# Patient Record
Sex: MALE | Race: WHITE | Employment: OTHER | URBAN - METROPOLITAN AREA
[De-identification: names, ages, dates, MRNs, and addresses within clinical notes are randomized per-mention and may not be internally consistent; named-entity substitution may affect disease eponyms.]

---

## 2021-12-09 ENCOUNTER — APPOINTMENT (OUTPATIENT)
Dept: GENERAL RADIOLOGY | Age: 66
DRG: 247 | End: 2021-12-09
Attending: EMERGENCY MEDICINE
Payer: MEDICARE

## 2021-12-09 ENCOUNTER — HOSPITAL ENCOUNTER (INPATIENT)
Age: 66
LOS: 1 days | Discharge: HOME | DRG: 247 | End: 2021-12-10
Attending: EMERGENCY MEDICINE | Admitting: FAMILY MEDICINE
Payer: MEDICARE

## 2021-12-09 DIAGNOSIS — I20.0 UNSTABLE ANGINA (HCC): ICD-10-CM

## 2021-12-09 DIAGNOSIS — I21.4 NSTEMI (NON-ST ELEVATED MYOCARDIAL INFARCTION) (HCC): Primary | ICD-10-CM

## 2021-12-09 DIAGNOSIS — I21.4 NSTEMI, INITIAL EPISODE OF CARE (HCC): ICD-10-CM

## 2021-12-09 PROBLEM — R07.9 CHEST PAIN: Status: ACTIVE | Noted: 2021-12-09

## 2021-12-09 LAB
ALBUMIN SERPL-MCNC: 4.4 G/DL (ref 3.5–5)
ALBUMIN/GLOB SERPL: 1.2 {RATIO} (ref 1.1–2.2)
ALP SERPL-CCNC: 83 U/L (ref 45–117)
ALT SERPL-CCNC: 34 U/L (ref 12–78)
ANION GAP SERPL CALC-SCNC: 8 MMOL/L (ref 5–15)
APTT PPP: 21.7 SEC (ref 22.1–31)
APTT PPP: 43 SEC (ref 22.1–31)
AST SERPL-CCNC: 30 U/L (ref 15–37)
BASOPHILS # BLD: 0.1 K/UL (ref 0–0.1)
BASOPHILS NFR BLD: 1 % (ref 0–1)
BILIRUB SERPL-MCNC: 0.5 MG/DL (ref 0.2–1)
BNP SERPL-MCNC: 323 PG/ML (ref 0–125)
BUN SERPL-MCNC: 26 MG/DL (ref 6–20)
BUN/CREAT SERPL: 23 (ref 12–20)
CALCIUM SERPL-MCNC: 9 MG/DL (ref 8.5–10.1)
CHLORIDE SERPL-SCNC: 104 MMOL/L (ref 97–108)
CO2 SERPL-SCNC: 29 MMOL/L (ref 21–32)
COMMENT, HOLDF: NORMAL
COVID-19 RAPID TEST, COVR: NOT DETECTED
CREAT SERPL-MCNC: 1.14 MG/DL (ref 0.7–1.3)
DIFFERENTIAL METHOD BLD: NORMAL
EOSINOPHIL # BLD: 0.1 K/UL (ref 0–0.4)
EOSINOPHIL NFR BLD: 3 % (ref 0–7)
ERYTHROCYTE [DISTWIDTH] IN BLOOD BY AUTOMATED COUNT: 12.3 % (ref 11.5–14.5)
GLOBULIN SER CALC-MCNC: 3.6 G/DL (ref 2–4)
GLUCOSE SERPL-MCNC: 87 MG/DL (ref 65–100)
HCT VFR BLD AUTO: 47.3 % (ref 36.6–50.3)
HGB BLD-MCNC: 15.5 G/DL (ref 12.1–17)
IMM GRANULOCYTES # BLD AUTO: 0 K/UL (ref 0–0.04)
IMM GRANULOCYTES NFR BLD AUTO: 0 % (ref 0–0.5)
LYMPHOCYTES # BLD: 0.8 K/UL (ref 0.8–3.5)
LYMPHOCYTES NFR BLD: 18 % (ref 12–49)
MAGNESIUM SERPL-MCNC: 2.2 MG/DL (ref 1.6–2.4)
MCH RBC QN AUTO: 30.5 PG (ref 26–34)
MCHC RBC AUTO-ENTMCNC: 32.8 G/DL (ref 30–36.5)
MCV RBC AUTO: 93.1 FL (ref 80–99)
MONOCYTES # BLD: 0.5 K/UL (ref 0–1)
MONOCYTES NFR BLD: 10 % (ref 5–13)
NEUTS SEG # BLD: 3.3 K/UL (ref 1.8–8)
NEUTS SEG NFR BLD: 68 % (ref 32–75)
NRBC # BLD: 0 K/UL (ref 0–0.01)
NRBC BLD-RTO: 0 PER 100 WBC
PLATELET # BLD AUTO: 173 K/UL (ref 150–400)
PMV BLD AUTO: 10.4 FL (ref 8.9–12.9)
POTASSIUM SERPL-SCNC: 4.9 MMOL/L (ref 3.5–5.1)
PROT SERPL-MCNC: 8 G/DL (ref 6.4–8.2)
RBC # BLD AUTO: 5.08 M/UL (ref 4.1–5.7)
SAMPLES BEING HELD,HOLD: NORMAL
SODIUM SERPL-SCNC: 141 MMOL/L (ref 136–145)
SOURCE, COVRS: NORMAL
THERAPEUTIC RANGE,PTTT: ABNORMAL SECS (ref 58–77)
THERAPEUTIC RANGE,PTTT: ABNORMAL SECS (ref 58–77)
TROPONIN-HIGH SENSITIVITY: 107 NG/L (ref 0–76)
TROPONIN-HIGH SENSITIVITY: 162 NG/L (ref 0–76)
TROPONIN-HIGH SENSITIVITY: 189 NG/L (ref 0–76)
TROPONIN-HIGH SENSITIVITY: 80 NG/L (ref 0–76)
TSH SERPL DL<=0.05 MIU/L-ACNC: 2.91 UIU/ML (ref 0.36–3.74)
WBC # BLD AUTO: 4.8 K/UL (ref 4.1–11.1)

## 2021-12-09 PROCEDURE — 65660000000 HC RM CCU STEPDOWN

## 2021-12-09 PROCEDURE — 84443 ASSAY THYROID STIM HORMONE: CPT

## 2021-12-09 PROCEDURE — 74011250637 HC RX REV CODE- 250/637: Performed by: EMERGENCY MEDICINE

## 2021-12-09 PROCEDURE — 74011250636 HC RX REV CODE- 250/636: Performed by: EMERGENCY MEDICINE

## 2021-12-09 PROCEDURE — 96376 TX/PRO/DX INJ SAME DRUG ADON: CPT

## 2021-12-09 PROCEDURE — 99223 1ST HOSP IP/OBS HIGH 75: CPT | Performed by: SPECIALIST

## 2021-12-09 PROCEDURE — 36415 COLL VENOUS BLD VENIPUNCTURE: CPT

## 2021-12-09 PROCEDURE — 94761 N-INVAS EAR/PLS OXIMETRY MLT: CPT

## 2021-12-09 PROCEDURE — 87635 SARS-COV-2 COVID-19 AMP PRB: CPT

## 2021-12-09 PROCEDURE — 85025 COMPLETE CBC W/AUTO DIFF WBC: CPT

## 2021-12-09 PROCEDURE — 84484 ASSAY OF TROPONIN QUANT: CPT

## 2021-12-09 PROCEDURE — G0378 HOSPITAL OBSERVATION PER HR: HCPCS

## 2021-12-09 PROCEDURE — 96374 THER/PROPH/DIAG INJ IV PUSH: CPT

## 2021-12-09 PROCEDURE — 83880 ASSAY OF NATRIURETIC PEPTIDE: CPT

## 2021-12-09 PROCEDURE — 93005 ELECTROCARDIOGRAM TRACING: CPT

## 2021-12-09 PROCEDURE — 85730 THROMBOPLASTIN TIME PARTIAL: CPT

## 2021-12-09 PROCEDURE — 74011250637 HC RX REV CODE- 250/637: Performed by: FAMILY MEDICINE

## 2021-12-09 PROCEDURE — 74011250636 HC RX REV CODE- 250/636: Performed by: FAMILY MEDICINE

## 2021-12-09 PROCEDURE — 80053 COMPREHEN METABOLIC PANEL: CPT

## 2021-12-09 PROCEDURE — 71045 X-RAY EXAM CHEST 1 VIEW: CPT

## 2021-12-09 PROCEDURE — 83735 ASSAY OF MAGNESIUM: CPT

## 2021-12-09 PROCEDURE — 99285 EMERGENCY DEPT VISIT HI MDM: CPT

## 2021-12-09 RX ORDER — SODIUM CHLORIDE 0.9 % (FLUSH) 0.9 %
5-40 SYRINGE (ML) INJECTION AS NEEDED
Status: DISCONTINUED | OUTPATIENT
Start: 2021-12-09 | End: 2021-12-12 | Stop reason: HOSPADM

## 2021-12-09 RX ORDER — AMLODIPINE BESYLATE 5 MG/1
5 TABLET ORAL DAILY
Status: DISCONTINUED | OUTPATIENT
Start: 2021-12-09 | End: 2021-12-12 | Stop reason: HOSPADM

## 2021-12-09 RX ORDER — ACETAMINOPHEN 325 MG/1
650 TABLET ORAL
Status: DISCONTINUED | OUTPATIENT
Start: 2021-12-09 | End: 2021-12-12 | Stop reason: HOSPADM

## 2021-12-09 RX ORDER — HEPARIN SODIUM 1000 [USP'U]/ML
2000 INJECTION, SOLUTION INTRAVENOUS; SUBCUTANEOUS ONCE
Status: COMPLETED | OUTPATIENT
Start: 2021-12-09 | End: 2021-12-09

## 2021-12-09 RX ORDER — HEPARIN SODIUM 1000 [USP'U]/ML
4000 INJECTION, SOLUTION INTRAVENOUS; SUBCUTANEOUS ONCE
Status: COMPLETED | OUTPATIENT
Start: 2021-12-09 | End: 2021-12-09

## 2021-12-09 RX ORDER — GUAIFENESIN 100 MG/5ML
81 LIQUID (ML) ORAL DAILY
Status: DISCONTINUED | OUTPATIENT
Start: 2021-12-10 | End: 2021-12-12 | Stop reason: HOSPADM

## 2021-12-09 RX ORDER — SODIUM CHLORIDE 0.9 % (FLUSH) 0.9 %
5-40 SYRINGE (ML) INJECTION EVERY 8 HOURS
Status: DISCONTINUED | OUTPATIENT
Start: 2021-12-09 | End: 2021-12-12 | Stop reason: HOSPADM

## 2021-12-09 RX ORDER — GUAIFENESIN 100 MG/5ML
162 LIQUID (ML) ORAL
Status: COMPLETED | OUTPATIENT
Start: 2021-12-09 | End: 2021-12-09

## 2021-12-09 RX ORDER — NITROGLYCERIN 0.4 MG/1
0.4 TABLET SUBLINGUAL
Status: DISCONTINUED | OUTPATIENT
Start: 2021-12-09 | End: 2021-12-12 | Stop reason: HOSPADM

## 2021-12-09 RX ORDER — HEPARIN SODIUM 10000 [USP'U]/100ML
11-25 INJECTION, SOLUTION INTRAVENOUS
Status: DISCONTINUED | OUTPATIENT
Start: 2021-12-09 | End: 2021-12-10

## 2021-12-09 RX ORDER — SODIUM CHLORIDE 9 MG/ML
75 INJECTION, SOLUTION INTRAVENOUS CONTINUOUS
Status: DISCONTINUED | OUTPATIENT
Start: 2021-12-09 | End: 2021-12-12 | Stop reason: HOSPADM

## 2021-12-09 RX ADMIN — AMLODIPINE BESYLATE 5 MG: 5 TABLET ORAL at 16:43

## 2021-12-09 RX ADMIN — HEPARIN SODIUM AND DEXTROSE 11 UNITS/KG/HR: 10000; 5 INJECTION INTRAVENOUS at 12:42

## 2021-12-09 RX ADMIN — HEPARIN SODIUM AND DEXTROSE 13 UNITS/KG/HR: 10000; 5 INJECTION INTRAVENOUS at 19:36

## 2021-12-09 RX ADMIN — Medication 10 ML: at 16:43

## 2021-12-09 RX ADMIN — ASPIRIN 81 MG CHEWABLE TABLET 162 MG: 81 TABLET CHEWABLE at 08:54

## 2021-12-09 RX ADMIN — HEPARIN SODIUM 2000 UNITS: 1000 INJECTION INTRAVENOUS; SUBCUTANEOUS at 19:36

## 2021-12-09 RX ADMIN — HEPARIN SODIUM 4000 UNITS: 1000 INJECTION INTRAVENOUS; SUBCUTANEOUS at 12:40

## 2021-12-09 RX ADMIN — SODIUM CHLORIDE 75 ML/HR: 9 INJECTION, SOLUTION INTRAVENOUS at 15:59

## 2021-12-09 NOTE — CONSULTS
12/9/2021    Cardiology Initial CareNote   [facility] Siv[place]umar [place] , [place]   --------PCP:-Jaquan Cox MD   -----Subjective:   Sandi Salazar is a 74 y.o. male  Initial Care Note requested for evaluation and management of   Elevated troponin and chest discomfort. Patient reports no prior medical history though he does admit 5 years ago he was told by his PCP to start considering medicines for lipids and blood pressure. He says he exercises regularly almost every day going to the gym. The last 4 days when he is exercise he is felt a chest discomfort which he says is not a chest pain. He says the chest bad feeling in the middle of his chest but is only present during the exercise initially. This discomfort is not associated with shortness of breath. He said no edema. proBNP is 323  Prior cardiac history denies  Used to chew tobacco retired [employer] [address] [address] wife present at bedside    Discussion/Plan/Impression:    His symptoms of chest discomfort are related to exertion and in that way sound typical for a pattern of unstable angina. What is less typical is that his troponins are not that elevated for 4 days of discomfort. I have discussed with him options for cath versus stress test and given the management is managed with both my recommendation is cardiac cath and he agrees to proceed he will have a radial left heart cath with Dr. Ofelia Payton tomorrow. He may eat tonight. He has been placed on heparin. He has had some EKG changes in the anterior wall with ST and T is  biphasic.        Lab Results   Component Value Date/Time    Creatinine 1.14 12/09/2021 08:26 AM      Results for orders placed or performed during the hospital encounter of 12/09/21   EKG, 12 LEAD, INITIAL   Result Value Ref Range    Ventricular Rate 65 BPM    Atrial Rate 65 BPM    P-R Interval 154 ms    QRS Duration 94 ms    Q-T Interval 404 ms    QTC Calculation (Bezet) 420 ms    Calculated P Axis 56 degrees    Calculated R Axis 12 degrees    Calculated T Axis 24 degrees    Diagnosis       Normal sinus rhythm  T wave abnormality, consider anterolateral ischemia  Abnormal ECG  When compared with ECG of 09-DEC-2021 08:51,  MANUAL COMPARISON REQUIRED, DATA IS UNCONFIRMED         Cardiac Studies/Hx:  No specialty comments available. Medical history notable for  has no past medical history on file. Social history notable for    Family history notable for family history is not on file. No past medical history on file. ROS-pertinents  negative except as above  The pertinent portions of the medical history,physician and nursing notes, meds,vitals , labs and Ins/Outs,are reviewed in the electronic record  Pt reports   Chest discomfortand the remainder of a complete multisystem 11 ROS  Are reviewed and negative    Social History     Tobacco Use    Smoking status: Not on file    Smokeless tobacco: Not on file   Substance Use Topics    Alcohol use: Not on file    Drug use: Not on file      No family history on file.    None     No Known Allergies   Objective:    Physical Exam:   Patient Vitals for the past 12 hrs:   Temp Pulse Resp BP SpO2   12/09/21 1545 97.9 °F (36.6 °C) (!) 57 17 (!) 143/84 94 %   12/09/21 1438  (!) 57      12/09/21 1435 97.9 °F (36.6 °C) 61 18 (!) 140/90 95 %   12/09/21 1100  64 16 118/73 96 %   12/09/21 1030  75 17 131/72 97 %   12/09/21 0900  66  139/81 97 %   12/09/21 0823 97.4 °F (36.3 °C) 72 14 (!) 171/81 99 %      General:  alert, cooperative, no distress, appears stated age   HEENT, Neck:  NC,AT- no JVD   Chest Wall: inspection normal - no chest wall deformities or tenderness, respiratory effort normal   Lung:   clear to auscultation bilaterally   Heart:    normal rate, regular rhythm, normal S1, S2, no murmurs, rubs, clicks or gallops   Abdomen: nondistended   Extremities: extremities normal, atraumatic, no cyanosis or edema     Last 24hr Input/Output:  No intake or output data in the 24 hours ending 12/09/21 3641     Data Review:   Recent Results (from the past 24 hour(s))   SAMPLES BEING HELD    Collection Time: 12/09/21  8:26 AM   Result Value Ref Range    SAMPLES BEING HELD 1RED, 1BLUE     COMMENT        Add-on orders for these samples will be processed based on acceptable specimen integrity and analyte stability, which may vary by analyte. NT-PRO BNP    Collection Time: 12/09/21  8:26 AM   Result Value Ref Range    NT pro- (H) 0 - 125 PG/ML   MAGNESIUM    Collection Time: 12/09/21  8:26 AM   Result Value Ref Range    Magnesium 2.2 1.6 - 2.4 mg/dL   CBC WITH AUTOMATED DIFF    Collection Time: 12/09/21  8:26 AM   Result Value Ref Range    WBC 4.8 4.1 - 11.1 K/uL    RBC 5.08 4.10 - 5.70 M/uL    HGB 15.5 12.1 - 17.0 g/dL    HCT 47.3 36.6 - 50.3 %    MCV 93.1 80.0 - 99.0 FL    MCH 30.5 26.0 - 34.0 PG    MCHC 32.8 30.0 - 36.5 g/dL    RDW 12.3 11.5 - 14.5 %    PLATELET 743 501 - 691 K/uL    MPV 10.4 8.9 - 12.9 FL    NRBC 0.0 0  WBC    ABSOLUTE NRBC 0.00 0.00 - 0.01 K/uL    NEUTROPHILS 68 32 - 75 %    LYMPHOCYTES 18 12 - 49 %    MONOCYTES 10 5 - 13 %    EOSINOPHILS 3 0 - 7 %    BASOPHILS 1 0 - 1 %    IMMATURE GRANULOCYTES 0 0.0 - 0.5 %    ABS. NEUTROPHILS 3.3 1.8 - 8.0 K/UL    ABS. LYMPHOCYTES 0.8 0.8 - 3.5 K/UL    ABS. MONOCYTES 0.5 0.0 - 1.0 K/UL    ABS. EOSINOPHILS 0.1 0.0 - 0.4 K/UL    ABS. BASOPHILS 0.1 0.0 - 0.1 K/UL    ABS. IMM.  GRANS. 0.0 0.00 - 0.04 K/UL    DF AUTOMATED     METABOLIC PANEL, COMPREHENSIVE    Collection Time: 12/09/21  8:26 AM   Result Value Ref Range    Sodium 141 136 - 145 mmol/L    Potassium 4.9 3.5 - 5.1 mmol/L    Chloride 104 97 - 108 mmol/L    CO2 29 21 - 32 mmol/L    Anion gap 8 5 - 15 mmol/L    Glucose 87 65 - 100 mg/dL    BUN 26 (H) 6 - 20 MG/DL    Creatinine 1.14 0.70 - 1.30 MG/DL    BUN/Creatinine ratio 23 (H) 12 - 20      GFR est AA >60 >60 ml/min/1.73m2    GFR est non-AA >60 >60 ml/min/1.73m2    Calcium 9.0 8.5 - 10.1 MG/DL Bilirubin, total 0.5 0.2 - 1.0 MG/DL    ALT (SGPT) 34 12 - 78 U/L    AST (SGOT) 30 15 - 37 U/L    Alk.  phosphatase 83 45 - 117 U/L    Protein, total 8.0 6.4 - 8.2 g/dL    Albumin 4.4 3.5 - 5.0 g/dL    Globulin 3.6 2.0 - 4.0 g/dL    A-G Ratio 1.2 1.1 - 2.2     TROPONIN-HIGH SENSITIVITY    Collection Time: 12/09/21  8:26 AM   Result Value Ref Range    Troponin-High Sensitivity 80 (HH) 0 - 76 ng/L   PTT    Collection Time: 12/09/21  8:26 AM   Result Value Ref Range    aPTT 21.7 (L) 22.1 - 31.0 sec    aPTT, therapeutic range     58.0 - 77.0 SECS   EKG, 12 LEAD, INITIAL    Collection Time: 12/09/21  8:26 AM   Result Value Ref Range    Ventricular Rate 63 BPM    Atrial Rate 63 BPM    P-R Interval 148 ms    QRS Duration 100 ms    Q-T Interval 392 ms    QTC Calculation (Bezet) 401 ms    Calculated P Axis 72 degrees    Calculated R Axis 26 degrees    Calculated T Axis 52 degrees    Diagnosis       Normal sinus rhythm  T wave abnormality, consider anterior ischemia  Abnormal ECG  No previous ECGs available     EKG, 12 LEAD, INITIAL    Collection Time: 12/09/21  8:51 AM   Result Value Ref Range    Ventricular Rate 68 BPM    Atrial Rate 68 BPM    P-R Interval 152 ms    QRS Duration 98 ms    Q-T Interval 412 ms    QTC Calculation (Bezet) 438 ms    Calculated P Axis 58 degrees    Calculated R Axis 11 degrees    Calculated T Axis 13 degrees    Diagnosis       Normal sinus rhythm  T wave abnormality, consider anterolateral ischemia  Abnormal ECG  When compared with ECG of 09-DEC-2021 08:26,  MANUAL COMPARISON REQUIRED, DATA IS UNCONFIRMED     EKG, 12 LEAD, INITIAL    Collection Time: 12/09/21  9:09 AM   Result Value Ref Range    Ventricular Rate 65 BPM    Atrial Rate 65 BPM    P-R Interval 154 ms    QRS Duration 94 ms    Q-T Interval 404 ms    QTC Calculation (Bezet) 420 ms    Calculated P Axis 56 degrees    Calculated R Axis 12 degrees    Calculated T Axis 24 degrees    Diagnosis       Normal sinus rhythm  T wave abnormality, consider anterolateral ischemia  Abnormal ECG  When compared with ECG of 09-DEC-2021 08:51,  MANUAL COMPARISON REQUIRED, DATA IS UNCONFIRMED     COVID-19 RAPID TEST    Collection Time: 12/09/21  9:30 AM   Result Value Ref Range    Specimen source Nasopharyngeal      COVID-19 rapid test Not detected NOTD     TROPONIN-HIGH SENSITIVITY    Collection Time: 12/09/21 11:27 AM   Result Value Ref Range    Troponin-High Sensitivity 107 (HH) 0 - 76 ng/L      No results found for: CHOL, CHOLX, CHLST, CHOLV, 344671, HDL, HDLP, LDL, LDLC, DLDLP, Opal García [facility], Remigio Lowe MD 12/9/2021

## 2021-12-09 NOTE — ED TRIAGE NOTES
Pt reports intermittent CP and tightness x4 days. Pt denies SOB. Pt reports he takes an 81 mg ASA daily. Pt denies any previous cardiac history.

## 2021-12-09 NOTE — PROGRESS NOTES
Spoke to ER doctor and reviewed case  CP x 4 days, worse today, now CP free with EKG changes ( not yet available for review) and first trop is 80 (borderline)  Agree with admit, will go to Hospitalist/Internal Medicine team service and I will see once arrives to Salomon Cedillo MD

## 2021-12-09 NOTE — H&P
History & Physical    Primary Care Provider: Chelsi Richard MD  Source of Information: Patient     History of Presenting Illness:   Cindy Olivier is a 74 y.o. male who presents with chest pain    History was probably obtained from the patient    Patient reports that his pain started about 3 days back, reports that he would not describe it truly is pain with pressure like sensation in a band shaped fashion around his chest.  Patient reports that about 3 days back he was on the treadmill under him and he had some discomfort in his chest, patient reports this morning he woke up and continued to feel the discomfort in his chest, got concerned and decided to come to the hospital, patient reports that about 5 years ago he went to his primary care doctor was told he has hypertension hyperlipidemia but chose to modify his diet diet and make lifestyle changes to mitigate the disease. Patient denies any other complaints or problems. The patient denies any Headache, blurry vision, sore throat, trouble swallowing, trouble with speech,, SOB, cough, fever, chills, N/V/D, abd pain, urinary symptoms, constipation, recent travels, sick contacts, focal or generalized neurological symptoms,  falls, injuries, rashes, contact with COVID-19 diagnosed patients, hematemesis, melena, hemoptysis, hematuria, rashes, denies starting any new medications and denies any other concerns or problems besides as mentioned above. Review of Systems:  A comprehensive review of systems was negative except for that written in the History of Present Illness. All other systems reviewed, pertinent positives and negatives noted in HPI    No past medical history on file. No past surgical history on file. Prior to Admission medications    Not on File     No Known Allergies   No family history on file.    Family history was discussed with the patient, all pertinent and relevant details are mentioned as above, no other pertinent and relevant family history was noted on my discussion with the patient. Patient specifically denies any history of Gaucher disease in the family  SOCIAL HISTORY:  Patient resides:  Independently X   Assisted Living    SNF    With family care       Smoking history:   None X   Former    Chronic      Alcohol history:   None    Social X   Chronic      Ambulates:   Independently X   w/cane    w/walker    w/wc    CODE STATUS:  DNR    Full X   Other      Objective:     Physical Exam:     Visit Vitals  BP (!) 140/90 (BP 1 Location: Right upper arm, BP Patient Position: At rest)   Pulse (!) 57   Temp 97.9 °F (36.6 °C)   Resp 18   Ht 5' 9\" (1.753 m)   Wt 88.2 kg (194 lb 7.1 oz)   SpO2 95%   BMI 28.71 kg/m²      O2 Device: None (Room air)    General : alert x 3, awake, no acute distress, resting in bed, pleasant male, appears to be stated age  HEENT: PEERL, EOMI, moist mucus membrane, TM clear  Neck: supple, no JVD, no meningeal signs  Chest: Clear to auscultation bilaterally   CVS: S1 S2 heard, Capillary refill less than 2 seconds  Abd: soft/ Non tender, non distended, BS physiological,   Ext: no clubbing, no cyanosis, no edema, brisk 2+ DP pulses  Neuro/Psych: pleasant mood and affect, CN 2-12 grossly intact, sensory grossly within normal limit, Strength 5/5 in all extremities, DTR 1+ x 4  Skin: warm     EKG:  . Data Review:     Recent Days:  Recent Labs     12/09/21  0826   WBC 4.8   HGB 15.5   HCT 47.3        Recent Labs     12/09/21  0826      K 4.9      CO2 29   GLU 87   BUN 26*   CREA 1.14   CA 9.0   MG 2.2   ALB 4.4   ALT 34     No results for input(s): PH, PCO2, PO2, HCO3, FIO2 in the last 72 hours.     24 Hour Results:  Recent Results (from the past 24 hour(s))   SAMPLES BEING HELD    Collection Time: 12/09/21  8:26 AM   Result Value Ref Range    SAMPLES BEING HELD 1RED, 1BLUE     COMMENT        Add-on orders for these samples will be processed based on acceptable specimen integrity and analyte stability, which may vary by analyte. NT-PRO BNP    Collection Time: 12/09/21  8:26 AM   Result Value Ref Range    NT pro- (H) 0 - 125 PG/ML   MAGNESIUM    Collection Time: 12/09/21  8:26 AM   Result Value Ref Range    Magnesium 2.2 1.6 - 2.4 mg/dL   CBC WITH AUTOMATED DIFF    Collection Time: 12/09/21  8:26 AM   Result Value Ref Range    WBC 4.8 4.1 - 11.1 K/uL    RBC 5.08 4.10 - 5.70 M/uL    HGB 15.5 12.1 - 17.0 g/dL    HCT 47.3 36.6 - 50.3 %    MCV 93.1 80.0 - 99.0 FL    MCH 30.5 26.0 - 34.0 PG    MCHC 32.8 30.0 - 36.5 g/dL    RDW 12.3 11.5 - 14.5 %    PLATELET 860 305 - 963 K/uL    MPV 10.4 8.9 - 12.9 FL    NRBC 0.0 0  WBC    ABSOLUTE NRBC 0.00 0.00 - 0.01 K/uL    NEUTROPHILS 68 32 - 75 %    LYMPHOCYTES 18 12 - 49 %    MONOCYTES 10 5 - 13 %    EOSINOPHILS 3 0 - 7 %    BASOPHILS 1 0 - 1 %    IMMATURE GRANULOCYTES 0 0.0 - 0.5 %    ABS. NEUTROPHILS 3.3 1.8 - 8.0 K/UL    ABS. LYMPHOCYTES 0.8 0.8 - 3.5 K/UL    ABS. MONOCYTES 0.5 0.0 - 1.0 K/UL    ABS. EOSINOPHILS 0.1 0.0 - 0.4 K/UL    ABS. BASOPHILS 0.1 0.0 - 0.1 K/UL    ABS. IMM. GRANS. 0.0 0.00 - 0.04 K/UL    DF AUTOMATED     METABOLIC PANEL, COMPREHENSIVE    Collection Time: 12/09/21  8:26 AM   Result Value Ref Range    Sodium 141 136 - 145 mmol/L    Potassium 4.9 3.5 - 5.1 mmol/L    Chloride 104 97 - 108 mmol/L    CO2 29 21 - 32 mmol/L    Anion gap 8 5 - 15 mmol/L    Glucose 87 65 - 100 mg/dL    BUN 26 (H) 6 - 20 MG/DL    Creatinine 1.14 0.70 - 1.30 MG/DL    BUN/Creatinine ratio 23 (H) 12 - 20      GFR est AA >60 >60 ml/min/1.73m2    GFR est non-AA >60 >60 ml/min/1.73m2    Calcium 9.0 8.5 - 10.1 MG/DL    Bilirubin, total 0.5 0.2 - 1.0 MG/DL    ALT (SGPT) 34 12 - 78 U/L    AST (SGOT) 30 15 - 37 U/L    Alk.  phosphatase 83 45 - 117 U/L    Protein, total 8.0 6.4 - 8.2 g/dL    Albumin 4.4 3.5 - 5.0 g/dL    Globulin 3.6 2.0 - 4.0 g/dL    A-G Ratio 1.2 1.1 - 2.2     TROPONIN-HIGH SENSITIVITY    Collection Time: 12/09/21 8:26 AM   Result Value Ref Range    Troponin-High Sensitivity 80 (HH) 0 - 76 ng/L   PTT    Collection Time: 12/09/21  8:26 AM   Result Value Ref Range    aPTT 21.7 (L) 22.1 - 31.0 sec    aPTT, therapeutic range     58.0 - 77.0 SECS   EKG, 12 LEAD, INITIAL    Collection Time: 12/09/21  8:26 AM   Result Value Ref Range    Ventricular Rate 63 BPM    Atrial Rate 63 BPM    P-R Interval 148 ms    QRS Duration 100 ms    Q-T Interval 392 ms    QTC Calculation (Bezet) 401 ms    Calculated P Axis 72 degrees    Calculated R Axis 26 degrees    Calculated T Axis 52 degrees    Diagnosis       Normal sinus rhythm  T wave abnormality, consider anterior ischemia  Abnormal ECG  No previous ECGs available     EKG, 12 LEAD, INITIAL    Collection Time: 12/09/21  8:51 AM   Result Value Ref Range    Ventricular Rate 68 BPM    Atrial Rate 68 BPM    P-R Interval 152 ms    QRS Duration 98 ms    Q-T Interval 412 ms    QTC Calculation (Bezet) 438 ms    Calculated P Axis 58 degrees    Calculated R Axis 11 degrees    Calculated T Axis 13 degrees    Diagnosis       Normal sinus rhythm  T wave abnormality, consider anterolateral ischemia  Abnormal ECG  When compared with ECG of 09-DEC-2021 08:26,  MANUAL COMPARISON REQUIRED, DATA IS UNCONFIRMED     EKG, 12 LEAD, INITIAL    Collection Time: 12/09/21  9:09 AM   Result Value Ref Range    Ventricular Rate 65 BPM    Atrial Rate 65 BPM    P-R Interval 154 ms    QRS Duration 94 ms    Q-T Interval 404 ms    QTC Calculation (Bezet) 420 ms    Calculated P Axis 56 degrees    Calculated R Axis 12 degrees    Calculated T Axis 24 degrees    Diagnosis       Normal sinus rhythm  T wave abnormality, consider anterolateral ischemia  Abnormal ECG  When compared with ECG of 09-DEC-2021 08:51,  MANUAL COMPARISON REQUIRED, DATA IS UNCONFIRMED     COVID-19 RAPID TEST    Collection Time: 12/09/21  9:30 AM   Result Value Ref Range    Specimen source Nasopharyngeal      COVID-19 rapid test Not detected NOTD TROPONIN-HIGH SENSITIVITY    Collection Time: 12/09/21 11:27 AM   Result Value Ref Range    Troponin-High Sensitivity 107 (HH) 0 - 76 ng/L         Imaging:       XR CHEST PORT    Result Date: 12/9/2021  No evidence of active lung disease. Assessment/Plan     Chest Pain  Accelerated hypertension    Patient recommended on a telemetry bed  Cycle troponins, aspirin, Pt on heparin gttspoke with cardiology, n.p.o. after midnight for possible stress/cath in the morning, supportive care close monitoring reassess as needed  Start patient Norvasc and monitor    DVT prophylaxis: Patient will be on heparin gtt           Please note that this dictation was completed with [employer], the computer voice recognition software. Quite often unanticipated grammatical, syntax, homophones, and other interpretive errors are inadvertently transcribed by the computer software. Please disregard these errors. Please excuse any errors that have escaped final proofreading.          Signed By: Cristal Rock MD     December 9, 2021

## 2021-12-09 NOTE — Clinical Note
Right radial artery. Accessed successfully. Radial access needle used.  Number of attempts =  1. 6 fr slender

## 2021-12-09 NOTE — ED PROVIDER NOTES
Patient is a 67-year-old male who denies past medical history but also states that he has not seen his primary care doctor in about 5 years. He presents emergency department with his wife for evaluation of chest pressure/tightness that he states started on Monday of this week. Initially he noted that exertion when on the treadmill at the gym seem to exacerbate the symptoms however today was having some symptoms while sitting in his arm chair at home this morning. Denies any prior cardiac history. He states that when he did go to the primary care doctor's office about 5 years ago he told was told that his blood pressure was borderline high and his cholesterol was borderline high. He declined to go on any medications for this at the time but was told just to continue with exercise and good diet. Patient states typically he exercises every day and goes to the gym in the mornings walking on the treadmill for an hour and a half or so. He states that his ability to do so has been decreased this week and over the past few days he is not done any fast walking due to his symptoms. Denies any syncope or diaphoresis. He states at 1 point he did have some perception of discomfort in his teeth but denies any other obvious radiation of pain. Denies any abdominal pain. Denies any peripheral swelling or calf pain. No past medical history on file. No past surgical history on file. No family history on file.     Social History     Socioeconomic History    Marital status:      Spouse name: Not on file    Number of children: Not on file    Years of education: Not on file    Highest education level: Not on file   Occupational History    Not on file   Tobacco Use    Smoking status: Not on file    Smokeless tobacco: Not on file   Substance and Sexual Activity    Alcohol use: Not on file    Drug use: Not on file    Sexual activity: Not on file   Other Topics Concern    Not on file   Social History Narrative    Not on file     Social Determinants of Health     Financial Resource Strain:     Difficulty of Paying Living Expenses: Not on file   Food Insecurity:     Worried About Running Out of Food in the Last Year: Not on file    [place] of Food in the Last Year: Not on file   Transportation Needs:     Lack of Transportation (Medical): Not on file    Lack of Transportation (Non-Medical): Not on file   Physical Activity:     Days of Exercise per Week: Not on file    Minutes of Exercise per Session: Not on file   Stress:     Feeling of Stress : Not on file   Social Connections:     Frequency of Communication with Friends and Family: Not on file    Frequency of Social Gatherings with Friends and Family: Not on file    Attends Sabianism Services: Not on file    Active Member of [address] [place] [address] [employer] or Organizations: Not on file    Attends Club or Organization Meetings: Not on file    Marital Status: Not on file   Intimate Partner Violence:     Fear of Current or Ex-Partner: Not on file    Emotionally Abused: Not on file    Physically Abused: Not on file    Sexually Abused: Not on file   Housing Stability:     Unable to Pay for Housing in the Last Year: Not on file    Number of [place] in the Last Year: Not on file    Unstable Housing in the Last Year: Not on file         ALLERGIES: Patient has no known allergies. Review of Systems   Constitutional: Negative for diaphoresis and fever. HENT: Negative for drooling and facial swelling. Eyes: Negative for photophobia. Respiratory: Positive for chest tightness. Negative for shortness of breath and stridor. Cardiovascular: Positive for chest pain. Negative for palpitations and leg swelling. Gastrointestinal: Negative for abdominal pain and vomiting. Musculoskeletal: Negative for back pain and neck pain. Skin: Negative for rash. Neurological: Negative for seizures, syncope and headaches. Hematological: Does not bruise/bleed easily. Psychiatric/Behavioral: Negative. Vitals:    12/09/21 0823   BP: (!) 171/81   Pulse: 72   Resp: 14   Temp: 97.4 °F (36.3 °C)   SpO2: 99%   Weight: 88.2 kg (194 lb 7.1 oz)   Height: 5' 9\" (1.753 m)            Physical Exam  Vitals and nursing note reviewed. Constitutional:       Appearance: He is not toxic-appearing. HENT:      Head: Normocephalic and atraumatic. Neck:      Trachea: No tracheal deviation. Cardiovascular:      Rate and Rhythm: Normal rate. Heart sounds: No friction rub. Pulmonary:      Effort: Pulmonary effort is normal.      Breath sounds: Normal breath sounds. No stridor. Chest:      Chest wall: No deformity or crepitus. Abdominal:      Palpations: Abdomen is soft. Musculoskeletal:      Right lower leg: No tenderness. No edema. Left lower leg: No tenderness. No edema. Skin:     General: Skin is warm and dry. Neurological:      Mental Status: He is alert and oriented to person, place, and time. Psychiatric:         Mood and Affect: Mood normal.         Behavior: Behavior normal.          MDM  Number of Diagnoses or Management Options  NSTEMI (non-ST elevated myocardial infarction) ([facility] [address]  Unstable angina ([facility] [address]  Diagnosis management comments: EKGs perfect served to on-call cardiologist who reviewed images and will follow. Amount and/or Complexity of Data Reviewed  Clinical lab tests: reviewed and ordered  Tests in the radiology section of CPT®: ordered and reviewed  Discuss the patient with other providers: yes  Independent visualization of images, tracings, or specimens: yes      ED Course as of 12/09/21 0924   Thu Dec 09, 2021   0905 EKG obtained at 826 showing sinus rhythm, rate 63, normal intervals, normal axis, nonspecific ST and T wave changes in anterior leads, no prior EKG available for comparison.  [JE]   [id number] Repeat EKG obtained at 851 showing sinus rhythm, rate 68, normal intervals, bimodal T waves in anterior lateral leads worrisome for ischemia, slightly changed compared to prior EKG. [JE]   0907 Repeat EKG obtained at 909 showing sinus rhythm, rate 65, normal intervals, T wave abnormality worrisome for ischemia anterior lateral leads [JE]      ED Course User Index  [JE] Shelbie Belle MD       Procedures          Perfect Serve Consult for Admission  9:24 AM    ED Room Number: SER05/05  Patient Name and age:  Millie Juarez 74 y.o.  male  Working Diagnosis:   1. NSTEMI (non-ST elevated myocardial infarction) ([facility] [address]    2. Unstable angina ([facility] [address]        COVID-19 Suspicion:  no  Sepsis present:  no  Reassessment needed: no  Code Status:  Full Code  Readmission: no  Isolation Requirements:  no  Recommended Level of Care:  telemetry  Department:[place] [facility] - [phone]  Other: Patient is a 73-year-old male who denies past medical history other has not been to the doctor in about 5 years who presents emergency department with symptoms consistent with angina ever since Monday of this week becoming unstable angina this morning. Started having chest pain at rest while sitting in his arm chair. No prior EKG available however EKG changes worrisome for ischemia noted on EKG with some dynamic changes on repeat. Discussed with Dr. Aleah Matos who will see in consult. Patient denies chest pain currently.   troponin slightly elevated at 80

## 2021-12-09 NOTE — ROUTINE PROCESS
TRANSFER - OUT REPORT:    Verbal report given to Juju(name) sonam Nunez  being transferred to ED(unit) for routine progression of care       Report consisted of patients Situation, Background, Assessment and   Recommendations(SBAR). Information from the following report(s) SBAR was reviewed with the receiving nurse. Lines:   Peripheral IV 12/09/21 Right Forearm (Active)   Site Assessment Clean, dry, & intact 12/09/21 0829   Phlebitis Assessment 0 12/09/21 0829   Infiltration Assessment 0 12/09/21 0829   Dressing Status Clean, dry, & intact 12/09/21 0829   Hub Color/Line Status Pink 12/09/21 0829       Peripheral IV 12/09/21 Left Forearm (Active)   Site Assessment Clean, dry, & intact 12/09/21 1254   Phlebitis Assessment 0 12/09/21 1254   Infiltration Assessment 0 12/09/21 1254   Dressing Status Clean, dry, & intact 12/09/21 1254   Hub Color/Line Status Pink 12/09/21 1254        Opportunity for questions and clarification was provided.       Patient transported with:   Monitor [facility] transport

## 2021-12-09 NOTE — Clinical Note
Status[de-identified] INPATIENT [101]   Type of Bed: Telemetry [19]   Cardiac Monitoring Required?: Yes   Inpatient Hospitalization Certified Necessary for the Following Reasons: 3.  Patient receiving treatment that can only be provided in an inpatient setting (further clarification in H&P documentation)   Admitting Diagnosis: NSTEMI (non-ST elevated myocardial infarction) [facility]) [[id number]]   Admitting Physician: Mikayla Johnson [[id number]]   Attending Physician: Abdulaziz Bedolla   Estimated Length of Stay: 2 Midnights   Discharge Plan[de-identified] Home with Office Follow-up

## 2021-12-10 ENCOUNTER — TRANSCRIBE ORDER (OUTPATIENT)
Dept: CARDIAC REHAB | Age: 66
End: 2021-12-10

## 2021-12-10 VITALS
HEIGHT: 69 IN | BODY MASS INDEX: 28.8 KG/M2 | RESPIRATION RATE: 13 BRPM | OXYGEN SATURATION: 96 % | DIASTOLIC BLOOD PRESSURE: 69 MMHG | SYSTOLIC BLOOD PRESSURE: 122 MMHG | TEMPERATURE: 97.9 F | WEIGHT: 194.45 LBS | HEART RATE: 57 BPM

## 2021-12-10 DIAGNOSIS — I21.4 ACUTE MYOCARDIAL INFARCTION, SUBENDOCARDIAL INFARCTION, INITIAL EPISODE OF CARE (HCC): Primary | ICD-10-CM

## 2021-12-10 DIAGNOSIS — Z95.5 STENTED CORONARY ARTERY: ICD-10-CM

## 2021-12-10 LAB
ACT BLD: 241 SECS (ref 79–138)
ACT BLD: 241 SECS (ref 79–138)
ACT BLD: 301 SECS (ref 79–138)
ACT BLD: 384 SECS (ref 79–138)
ANION GAP SERPL CALC-SCNC: 3 MMOL/L (ref 5–15)
APTT PPP: 57.5 SEC (ref 22.1–31)
APTT PPP: 58.7 SEC (ref 22.1–31)
BASOPHILS # BLD: 0 K/UL (ref 0–0.1)
BASOPHILS NFR BLD: 1 % (ref 0–1)
BUN SERPL-MCNC: 18 MG/DL (ref 6–20)
BUN/CREAT SERPL: 19 (ref 12–20)
CALCIUM SERPL-MCNC: 8.8 MG/DL (ref 8.5–10.1)
CHLORIDE SERPL-SCNC: 110 MMOL/L (ref 97–108)
CHOLEST SERPL-MCNC: 197 MG/DL
CO2 SERPL-SCNC: 27 MMOL/L (ref 21–32)
COMMENT, HOLDF: NORMAL
CREAT SERPL-MCNC: 0.97 MG/DL (ref 0.7–1.3)
DIFFERENTIAL METHOD BLD: ABNORMAL
EOSINOPHIL # BLD: 0.3 K/UL (ref 0–0.4)
EOSINOPHIL NFR BLD: 8 % (ref 0–7)
ERYTHROCYTE [DISTWIDTH] IN BLOOD BY AUTOMATED COUNT: 12.4 % (ref 11.5–14.5)
GLUCOSE SERPL-MCNC: 101 MG/DL (ref 65–100)
HCT VFR BLD AUTO: 39.5 % (ref 36.6–50.3)
HDLC SERPL-MCNC: 63 MG/DL
HDLC SERPL: 3.1 {RATIO} (ref 0–5)
HGB BLD-MCNC: 13.4 G/DL (ref 12.1–17)
IMM GRANULOCYTES # BLD AUTO: 0 K/UL (ref 0–0.04)
IMM GRANULOCYTES NFR BLD AUTO: 0 % (ref 0–0.5)
LDLC SERPL CALC-MCNC: 123.2 MG/DL (ref 0–100)
LYMPHOCYTES # BLD: 1 K/UL (ref 0.8–3.5)
LYMPHOCYTES NFR BLD: 30 % (ref 12–49)
MCH RBC QN AUTO: 31.1 PG (ref 26–34)
MCHC RBC AUTO-ENTMCNC: 33.9 G/DL (ref 30–36.5)
MCV RBC AUTO: 91.6 FL (ref 80–99)
MONOCYTES # BLD: 0.3 K/UL (ref 0–1)
MONOCYTES NFR BLD: 9 % (ref 5–13)
NEUTS SEG # BLD: 1.7 K/UL (ref 1.8–8)
NEUTS SEG NFR BLD: 52 % (ref 32–75)
NRBC # BLD: 0 K/UL (ref 0–0.01)
NRBC BLD-RTO: 0 PER 100 WBC
PLATELET # BLD AUTO: 146 K/UL (ref 150–400)
PMV BLD AUTO: 10.1 FL (ref 8.9–12.9)
POTASSIUM SERPL-SCNC: 4.2 MMOL/L (ref 3.5–5.1)
RBC # BLD AUTO: 4.31 M/UL (ref 4.1–5.7)
SAMPLES BEING HELD,HOLD: NORMAL
SODIUM SERPL-SCNC: 140 MMOL/L (ref 136–145)
THERAPEUTIC RANGE,PTTT: ABNORMAL SECS (ref 58–77)
THERAPEUTIC RANGE,PTTT: ABNORMAL SECS (ref 58–77)
TRIGL SERPL-MCNC: 54 MG/DL (ref ?–150)
TROPONIN-HIGH SENSITIVITY: 161 NG/L (ref 0–76)
TROPONIN-HIGH SENSITIVITY: 233 NG/L (ref 0–76)
VLDLC SERPL CALC-MCNC: 10.8 MG/DL
WBC # BLD AUTO: 3.3 K/UL (ref 4.1–11.1)

## 2021-12-10 PROCEDURE — 77030040934 HC CATH DIAG DXTERITY MEDT -A: Performed by: STUDENT IN AN ORGANIZED HEALTH CARE EDUCATION/TRAINING PROGRAM

## 2021-12-10 PROCEDURE — C1725 CATH, TRANSLUMIN NON-LASER: HCPCS | Performed by: STUDENT IN AN ORGANIZED HEALTH CARE EDUCATION/TRAINING PROGRAM

## 2021-12-10 PROCEDURE — 99152 MOD SED SAME PHYS/QHP 5/>YRS: CPT | Performed by: STUDENT IN AN ORGANIZED HEALTH CARE EDUCATION/TRAINING PROGRAM

## 2021-12-10 PROCEDURE — G0378 HOSPITAL OBSERVATION PER HR: HCPCS

## 2021-12-10 PROCEDURE — 74011250637 HC RX REV CODE- 250/637: Performed by: STUDENT IN AN ORGANIZED HEALTH CARE EDUCATION/TRAINING PROGRAM

## 2021-12-10 PROCEDURE — 77030042317 HC BND COMPR HEMSTAT -B: Performed by: STUDENT IN AN ORGANIZED HEALTH CARE EDUCATION/TRAINING PROGRAM

## 2021-12-10 PROCEDURE — 92928 PRQ TCAT PLMT NTRAC ST 1 LES: CPT | Performed by: STUDENT IN AN ORGANIZED HEALTH CARE EDUCATION/TRAINING PROGRAM

## 2021-12-10 PROCEDURE — 80048 BASIC METABOLIC PNL TOTAL CA: CPT

## 2021-12-10 PROCEDURE — 84484 ASSAY OF TROPONIN QUANT: CPT

## 2021-12-10 PROCEDURE — 85730 THROMBOPLASTIN TIME PARTIAL: CPT

## 2021-12-10 PROCEDURE — C1894 INTRO/SHEATH, NON-LASER: HCPCS | Performed by: STUDENT IN AN ORGANIZED HEALTH CARE EDUCATION/TRAINING PROGRAM

## 2021-12-10 PROCEDURE — 93005 ELECTROCARDIOGRAM TRACING: CPT

## 2021-12-10 PROCEDURE — 77030013715 HC INFL SYS MRTM -B: Performed by: STUDENT IN AN ORGANIZED HEALTH CARE EDUCATION/TRAINING PROGRAM

## 2021-12-10 PROCEDURE — 74011000250 HC RX REV CODE- 250: Performed by: STUDENT IN AN ORGANIZED HEALTH CARE EDUCATION/TRAINING PROGRAM

## 2021-12-10 PROCEDURE — 027034Z DILATION OF CORONARY ARTERY, ONE ARTERY WITH DRUG-ELUTING INTRALUMINAL DEVICE, PERCUTANEOUS APPROACH: ICD-10-PCS | Performed by: STUDENT IN AN ORGANIZED HEALTH CARE EDUCATION/TRAINING PROGRAM

## 2021-12-10 PROCEDURE — 93458 L HRT ARTERY/VENTRICLE ANGIO: CPT | Performed by: STUDENT IN AN ORGANIZED HEALTH CARE EDUCATION/TRAINING PROGRAM

## 2021-12-10 PROCEDURE — 92978 ENDOLUMINL IVUS OCT C 1ST: CPT | Performed by: STUDENT IN AN ORGANIZED HEALTH CARE EDUCATION/TRAINING PROGRAM

## 2021-12-10 PROCEDURE — 74011250636 HC RX REV CODE- 250/636: Performed by: FAMILY MEDICINE

## 2021-12-10 PROCEDURE — 4A023N7 MEASUREMENT OF CARDIAC SAMPLING AND PRESSURE, LEFT HEART, PERCUTANEOUS APPROACH: ICD-10-PCS | Performed by: STUDENT IN AN ORGANIZED HEALTH CARE EDUCATION/TRAINING PROGRAM

## 2021-12-10 PROCEDURE — 74011250636 HC RX REV CODE- 250/636: Performed by: STUDENT IN AN ORGANIZED HEALTH CARE EDUCATION/TRAINING PROGRAM

## 2021-12-10 PROCEDURE — 77030013744: Performed by: STUDENT IN AN ORGANIZED HEALTH CARE EDUCATION/TRAINING PROGRAM

## 2021-12-10 PROCEDURE — 77030010221 HC SPLNT WR POS TELE -B: Performed by: STUDENT IN AN ORGANIZED HEALTH CARE EDUCATION/TRAINING PROGRAM

## 2021-12-10 PROCEDURE — B240ZZ3 ULTRASONOGRAPHY OF SINGLE CORONARY ARTERY, INTRAVASCULAR: ICD-10-PCS | Performed by: STUDENT IN AN ORGANIZED HEALTH CARE EDUCATION/TRAINING PROGRAM

## 2021-12-10 PROCEDURE — 36415 COLL VENOUS BLD VENIPUNCTURE: CPT

## 2021-12-10 PROCEDURE — 80061 LIPID PANEL: CPT

## 2021-12-10 PROCEDURE — C1887 CATHETER, GUIDING: HCPCS | Performed by: STUDENT IN AN ORGANIZED HEALTH CARE EDUCATION/TRAINING PROGRAM

## 2021-12-10 PROCEDURE — 99153 MOD SED SAME PHYS/QHP EA: CPT | Performed by: STUDENT IN AN ORGANIZED HEALTH CARE EDUCATION/TRAINING PROGRAM

## 2021-12-10 PROCEDURE — C1874 STENT, COATED/COV W/DEL SYS: HCPCS | Performed by: STUDENT IN AN ORGANIZED HEALTH CARE EDUCATION/TRAINING PROGRAM

## 2021-12-10 PROCEDURE — C1769 GUIDE WIRE: HCPCS | Performed by: STUDENT IN AN ORGANIZED HEALTH CARE EDUCATION/TRAINING PROGRAM

## 2021-12-10 PROCEDURE — 85347 COAGULATION TIME ACTIVATED: CPT

## 2021-12-10 PROCEDURE — C1753 CATH, INTRAVAS ULTRASOUND: HCPCS | Performed by: STUDENT IN AN ORGANIZED HEALTH CARE EDUCATION/TRAINING PROGRAM

## 2021-12-10 PROCEDURE — B2111ZZ FLUOROSCOPY OF MULTIPLE CORONARY ARTERIES USING LOW OSMOLAR CONTRAST: ICD-10-PCS | Performed by: STUDENT IN AN ORGANIZED HEALTH CARE EDUCATION/TRAINING PROGRAM

## 2021-12-10 PROCEDURE — 85025 COMPLETE CBC W/AUTO DIFF WBC: CPT

## 2021-12-10 DEVICE — XIENCE SIERRA™ EVEROLIMUS ELUTING CORONARY STENT SYSTEM 3.00 MM X 18 MM / RAPID-EXCHANGE
Type: IMPLANTABLE DEVICE | Status: FUNCTIONAL
Brand: XIENCE SIERRA™

## 2021-12-10 RX ORDER — VERAPAMIL HYDROCHLORIDE 2.5 MG/ML
INJECTION, SOLUTION INTRAVENOUS AS NEEDED
Status: DISCONTINUED | OUTPATIENT
Start: 2021-12-10 | End: 2021-12-10 | Stop reason: HOSPADM

## 2021-12-10 RX ORDER — ROSUVASTATIN CALCIUM 20 MG/1
20 TABLET, COATED ORAL
Qty: 30 TABLET | Refills: 0 | Status: SHIPPED | OUTPATIENT
Start: 2021-12-10 | End: 2021-12-10 | Stop reason: SDUPTHER

## 2021-12-10 RX ORDER — ROSUVASTATIN CALCIUM 20 MG/1
20 TABLET, COATED ORAL
Qty: 30 TABLET | Refills: 0 | Status: SHIPPED | OUTPATIENT
Start: 2021-12-10 | End: 2022-01-03

## 2021-12-10 RX ORDER — ACETAMINOPHEN 325 MG/1
650 TABLET ORAL
Qty: 12 TABLET | Refills: 0 | Status: SHIPPED | OUTPATIENT
Start: 2021-12-10 | End: 2021-12-17

## 2021-12-10 RX ORDER — GUAIFENESIN 100 MG/5ML
81 LIQUID (ML) ORAL DAILY
Qty: 30 TABLET | Refills: 0 | Status: SHIPPED | OUTPATIENT
Start: 2021-12-10

## 2021-12-10 RX ORDER — HEPARIN SODIUM 1000 [USP'U]/ML
INJECTION, SOLUTION INTRAVENOUS; SUBCUTANEOUS AS NEEDED
Status: DISCONTINUED | OUTPATIENT
Start: 2021-12-10 | End: 2021-12-10 | Stop reason: HOSPADM

## 2021-12-10 RX ORDER — SODIUM CHLORIDE 0.9 % (FLUSH) 0.9 %
5-40 SYRINGE (ML) INJECTION AS NEEDED
Status: DISCONTINUED | OUTPATIENT
Start: 2021-12-10 | End: 2021-12-12 | Stop reason: HOSPADM

## 2021-12-10 RX ORDER — SODIUM CHLORIDE 0.9 % (FLUSH) 0.9 %
5-40 SYRINGE (ML) INJECTION EVERY 8 HOURS
Status: DISCONTINUED | OUTPATIENT
Start: 2021-12-10 | End: 2021-12-12 | Stop reason: HOSPADM

## 2021-12-10 RX ORDER — SODIUM CHLORIDE 9 MG/ML
150 INJECTION, SOLUTION INTRAVENOUS CONTINUOUS
Status: DISCONTINUED | OUTPATIENT
Start: 2021-12-10 | End: 2021-12-10 | Stop reason: HOSPADM

## 2021-12-10 RX ORDER — LIDOCAINE HYDROCHLORIDE 10 MG/ML
INJECTION INFILTRATION; PERINEURAL AS NEEDED
Status: DISCONTINUED | OUTPATIENT
Start: 2021-12-10 | End: 2021-12-10 | Stop reason: HOSPADM

## 2021-12-10 RX ORDER — AMLODIPINE BESYLATE 5 MG/1
5 TABLET ORAL DAILY
Qty: 30 TABLET | Refills: 0 | Status: SHIPPED | OUTPATIENT
Start: 2021-12-10 | End: 2021-12-17

## 2021-12-10 RX ORDER — FENTANYL CITRATE 50 UG/ML
INJECTION, SOLUTION INTRAMUSCULAR; INTRAVENOUS AS NEEDED
Status: DISCONTINUED | OUTPATIENT
Start: 2021-12-10 | End: 2021-12-10 | Stop reason: HOSPADM

## 2021-12-10 RX ORDER — ROSUVASTATIN CALCIUM 10 MG/1
20 TABLET, COATED ORAL
Status: DISCONTINUED | OUTPATIENT
Start: 2021-12-10 | End: 2021-12-12 | Stop reason: HOSPADM

## 2021-12-10 RX ORDER — MIDAZOLAM HYDROCHLORIDE 1 MG/ML
INJECTION, SOLUTION INTRAMUSCULAR; INTRAVENOUS AS NEEDED
Status: DISCONTINUED | OUTPATIENT
Start: 2021-12-10 | End: 2021-12-10 | Stop reason: HOSPADM

## 2021-12-10 RX ADMIN — SODIUM CHLORIDE 75 ML/HR: 9 INJECTION, SOLUTION INTRAVENOUS at 04:52

## 2021-12-10 RX ADMIN — Medication 5 ML: at 06:00

## 2021-12-10 NOTE — PROCEDURES
BRIEF PROCEDURE NOTE    Date of Procedure: 12/10/2021   Preoperative Diagnosis: NSTEMI  Postoperative Diagnosis: NSTEMI, coronary artery disease  Procedure: Left heart cath, coronary angiography  Interventional Cardiologist: Li Haney DO  Assistant: None  Anesthesia: local + IV moderate sedation   I administered moderate sedation throughout this procedure. An independent trained observer pushed medications at my direction, and monitored the patients level of consciousness and physiological status throughout. Estimated Blood Loss: Minimal    Access: Right radial artery, 6F  Catheters:  Left coronary: JL 3.5, 5F  Right coronary: JR4, 5F    Findings:   L Main: Large caliber vessel, no significant disease  LAD: Large caliber vessel, calcified, mid vessel 95% stenosis with sequential tubular 40% disease throughout mid into distal vessel, small to moderate diagonal with diffuse mild disease  LCx: Moderate caliber vessel, moderate high OM1, very small OM 2 and very small OM 3 with diffuse mild disease  RCA: Large-caliber, dominant vessel, mid right coronary artery with tubular 30 to 40% stenosis with a distal diffuse mild disease, small PDA, moderate long posterior lateral branch with diffuse mild disease    LVEDP:  10 mmhg    PCI:  EBU 3.5, 6 Western Amena guide  Systemic heparin for ACT greater than 250    Yonas blue passed into distal LAD. Mid LAD dilated with 2.5 X12 millimeter compliant balloon at 12 michael. IVUS was used for vessel sizing. Very difficult to pass stent through proximal calcified LAD. Second Yonas blue was passed into distal LAD has tim wire. Initially tried to pass a 3.0X 26 mm Medtronic [place] stent, unable to deliver. Attempted to deliver 3.0X 22 mm Medtronic Braxton stent. Unable to deliver. 6 Western Amena guide liner was passed in the proximal LAD. Unable to deliver 22 mm stent. Subsequently able to deliver [place] 3.0X 18 mm AMOL. Deployed at 18 michael.   After stent deployment there was exaggeration of the stenosis in the mid to distal LAD. Required use of tim wire to pass a 3.5 noncompliant balloon. Stent postdilated at 18 michael. Unable to deliver IVUS to reassess the distal stent edge. There is no haziness at the distal stent edge. No response to nitroglycerin. Based on prestent IVUS, I think there is exaggeration of the stenosis at the distal stent edge rather than distal edge stent dissection or hematoma. DAY-3 flow in the distal vessel at the conclusion of the procedure. No evidence of dissection within proximal LAD. Patient tolerated procedure well without any anginal chest pain post procedure. Specimens Removed: None    Implants: Xience Robyn AMOL    Closure Device: radial TR band    See full cath note. Complications: none      Findings:  1. Single-vessel severe coronary artery disease involving mid into distal LAD. Angiography demonstrates diffuse mild to moderate disease within the circumflex and right coronary artery system  2. Mid LAD stenting with 3.0X 18 mm Ion [place] AOML optimized with 3 5 noncompliant balloon at high pressure  3. Normal LVEDP    Plan:  Echocardiogram pending  Goal-directed medical therapy with the addition of Crestor 20 mg daily  Brilinta based dual antiplatelet therapy optimally for 12 months due to acute myocardial infarction presentation.     DO Coyn Serna DO  [facility] [address]                                              Office [phone],VXR [phone]

## 2021-12-10 NOTE — PROGRESS NOTES
Changed Brilinta and Crestor to e-prescribe and have confirmed that [facility] has the Rx  Pt will see me in office next week  Future Appointments   Date Time Provider Staci Moctezuma   12/17/2021 10:00 AM Thad Dorsey MD [facility]

## 2021-12-10 NOTE — ED NOTES
Purposeful hourly rounding completed. Patient is sleeping and has no complaints at this time. Patient in a position of comfort. Belongings secure in their room, side rails up x 2, bedside table within reach, and call bell within reach. Will continue to monitor and re-assess as appropriate.

## 2021-12-10 NOTE — PROGRESS NOTES
Bedside shift change report given to Arlene Obregon RN (oncoming nurse) by Tereso Saleh RN (offgoing nurse). Report included the following information SBAR, Kardex, MAR and Recent Results.

## 2021-12-10 NOTE — CARDIO/PULMONARY
Cardiac rehab: Per Dr. Denise New, pt diagnosed with NSTEMI and is now s/p stent. Referral initiated.  [facility] is on [facility].

## 2021-12-10 NOTE — ED NOTES
Verbal shift change report given to ЕЛЕНА Rehman (oncoming nurse) by Helen Garay RN (offgoing nurse). Report included the following information SBAR, ED Summary, Intake/Output, MAR and Recent Results.

## 2021-12-10 NOTE — DISCHARGE SUMMARY
Discharge Summary       PATIENT ID: Sherry Mendez  MRN: [id number]   YOB: 1955    DATE OF ADMISSION: 12/9/2021  8:16 AM    DATE OF DISCHARGE: 12/10/21  PRIMARY CARE PROVIDER: Ronnie Sweeney MD     ATTENDING PHYSICIAN:12/10/21  DISCHARGING PROVIDER: Trish Dixon MD    To contact this individual call [phone] and ask the  to page. If unavailable ask to be transferred the Adult Hospitalist Department. CONSULTATIONS: IP CONSULT TO CARDIOLOGY    PROCEDURES/SURGERIES: Procedure(s):  LEFT HEART CATH / CORONARY ANGIOGRAPHY  PERCUTANEOUS CORONARY INTERVENTION  INTRAVASCULAR ULTRASOUND    ADMITTING DIAGNOSES & HOSPITAL COURSE:   75 y/o M with HTN,HLD presented to ER  with exertional chest pain x 3 days started while he was on treadmill. Pt reports had chest discomfort 5 days ago, seen his pcp,discussed med adjustment for htn,lipids and life style changes. pt continues to have chest pain which prompted ER visit . Pt admitted with NSTEMI      DISCHARGE DIAGNOSES / PLAN:      NSTEMI  -elevated troponin  -Started on  heparin drip  -cardiology consulted  -cardiac cath, cath with Dr Alvin Yuen  -12/10/21 Left heart cath, coronary angiography  Findings:  1. Single-vessel severe coronary artery disease involving mid into distal LAD. Angiography demonstrates diffuse mild to moderate disease within the circumflex and right coronary artery system  2. Mid LAD stenting with 3.0X 18 mm Ion Rapid7 AMOL optimized with 3 5 noncompliant balloon at high pressure  3. Normal LVEDP  Cardiology Plan:  Echocardiogram as outpatient  Goal-directed medical therapy with the addition of Crestor 20 mg daily  Brilinta based dual antiplatelet therapy optimally for 12 months due to acute myocardial infarction presentation.      HTN  - high initially   - improved on norvasc  -advise to monitor ,register log daily and take it to cardiology f/u         FOLLOW UP APPOINTMENTS:    Follow-up Information     Follow up With Specialties Details Why Contact Info Additional Information    [address] Cardiac Rehabilitation Call for outpatient cardiac rehab program if not received call by end of first week home  120 [facility] [address] [place] [address] [place] [address], [address]. Please arrive 15 minutes prior to your appointment time and you will register in the [facility] [address], [address], on the first floor of the [address] Telephone: [phone] Fax: [phone] Driving directions To [facility] [place] and [facility] [place]. Building: Driving WEST on [address], take exit [address] to [address]. Turn left onto [address], then turn right into [place] [address] Driving EAST on [address], take exit [address] [facility]. Turn right at the end of the exit ramp. Turn left onto [address], then turn right into [address] lot. Ryland Hanks DO Cardiology, Interventional Cardiology In 2 weeks post hospitalization visit [address] [facility] [address] Muriel Wild MD Family Medicine   200 [facility] [address] [facility]  [phone]                DIET: Cardiac Diet      ACTIVITY: Activity as tolerated    WOUND CARE: keep dressing clean and dry , may shower tomorrow and pat it dry           DISCHARGE MEDICATIONS:  Current Discharge Medication List      START taking these medications    Details   acetaminophen (TYLENOL) 325 mg tablet Take 2 Tablets by mouth every four (4) hours as needed for Pain. Qty: 12 Tablet, Refills: 0  Start date: 12/10/2021      amLODIPine (NORVASC) 5 mg tablet Take 1 Tablet by mouth daily. Qty: 30 Tablet, Refills: 0  Start date: 12/10/2021      aspirin 81 mg chewable tablet Take 1 Tablet by mouth daily. Qty: 30 Tablet, Refills: 0  Start date: 12/10/2021      rosuvastatin (CRESTOR) 20 mg tablet Take 1 Tablet by mouth nightly.   Qty: 30 Tablet, Refills: 0  Start date: 12/10/2021      ticagrelor (BRILINTA) 90 mg tablet Take 1 Tablet by mouth every twelve (12) hours every twelve (12) hours. Qty: 60 Tablet, Refills: 0  Start date: 12/10/2021               NOTIFY YOUR PHYSICIAN FOR ANY OF THE FOLLOWING:   Fever over 101 degrees for 24 hours. Chest pain, shortness of breath, fever, chills, nausea, vomiting, diarrhea, change in mentation, falling, weakness, bleeding. Severe pain or pain not relieved by medications. Or, any other signs or symptoms that you may have questions about. DISPOSITION:    Home With:self care    OT  PT  HH  RN       Long term SNF/Inpatient Rehab    Independent/assisted living    Hospice    Other:       PATIENT CONDITION AT DISCHARGE:     Functional status    Poor     Deconditioned    x Independent      Cognition    x Lucid     Forgetful     Dementia      Catheters/lines (plus indication)    [place]     PICC     PEG    x None      Code status    x Full code     DNR      PHYSICAL EXAMINATION AT DISCHARGE:  /70 (BP 1 Location: Left arm, BP Patient Position: At rest)   Pulse (!) 58   Temp 97.9 °F (36.6 °C)   Resp 11   Ht 5' 9\" (1.753 m)   Wt 88.2 kg (194 lb 7.1 oz)   SpO2 98%   BMI 28.71 kg/m²   General:          Alert, cooperative, no distress, appears stated age. HEENT:           Atraumatic, anicteric sclerae, pink conjunctivae                          No oral ulcers, mucosa moist, throat clear, dentition fair  Neck:               Supple, symmetrical  Lungs:             Clear to auscultation bilaterally. No Wheezing or Rhonchi. No rales. Chest wall:      No tenderness  No Accessory muscle use. Heart:              Regular  rhythm,  No  murmur   No edema  Abdomen:        Soft, non-tender. Not distended. Bowel sounds normal  Extremities:     No cyanosis. No clubbing,                            Skin turgor normal, Capillary refill normal  Skin:                Not pale.   Not Jaundiced  No rashes   Psych:             Not anxious or agitated.   Neurologic:      Alert, moves all extremities, answers questions appropriately and responds to commands       CHRONIC MEDICAL DIAGNOSES:  Problem List as of 12/10/2021 Never Reviewed          Codes Class Noted - Resolved    NSTEMI (non-ST elevated myocardial infarction) [facility]) ICD-10-CM: I21.4  ICD-9-CM: 410.70  12/9/2021 - Present        Chest pain ICD-10-CM: R07.9  ICD-9-CM: 786.50  12/9/2021 - Present              Greater than 30  minutes were spent with the patient on counseling and coordination of care    Signed:   Leena Castaneda MD  12/10/2021  3:52 PM

## 2021-12-10 NOTE — PROGRESS NOTES
TRANSFER - IN REPORT:    Verbal report received from Sherie RN on Benjamin Guevara  being received from Emergency Department for ordered procedure      Report consisted of patients Situation, Background, Assessment and   Recommendations(SBAR). Information from the following report(s) SBAR, ED Summary and MAR was reviewed with the receiving nurse. Opportunity for questions and clarification was provided. Assessment completed upon patients arrival to unit and care assumed.

## 2021-12-10 NOTE — DISCHARGE INSTRUCTIONS
Future Appointments   Date Time Provider Staci Rhianna   12/17/2021 10:00 AM MD JAQUELINE Brennan BS AMB   12/20/2021 10:00 AM Kasey Danielle NP CAVREY BS AMB     See Dr Clement Johnson 12-17-21 at 10 AM  Cancel 12-20-21 appt             Discharge SNF/Rehab Instructions/LTAC       PATIENT ID: Kwaku Thompson  MRN: [id number]   YOB: 1955    DATE OF ADMISSION: 12/9/2021  8:16 AM    DATE OF DISCHARGE: 12/10/2021    PRIMARY CARE PROVIDER: Ivonne Nickerson MD       ATTENDING PHYSICIAN: Devon Carey MD  DISCHARGING PROVIDER: Huma Escoto MD     To contact this individual call [phone] and ask the  to page. If unavailable ask to be transferred the Adult Hospitalist Department. CONSULTATIONS: IP CONSULT TO CARDIOLOGY    PROCEDURES/SURGERIES: Procedure(s):  LEFT HEART CATH / CORONARY ANGIOGRAPHY  PERCUTANEOUS CORONARY INTERVENTION  INTRAVASCULAR ULTRASOUND    ADMITTING [address] [place] [address] COURSE:   ***        DISCHARGE DIAGNOSES / PLAN:      1.  ***       PENDING TEST RESULTS:   At the time of discharge the following test results are still pending: ***    FOLLOW UP APPOINTMENTS:    Follow-up Information     Follow up With Specialties Details Why Contact Info Additional Information    [address] Cardiac Rehabilitation Call for outpatient cardiac rehab program if not received call by end of first week home  120 [facility] [address] [place] [address],[address] Floor [address] [place] [address], [address]. Please arrive 15 minutes prior to your appointment time and you will register in the [place] [address], [address], on the first floor of the [address] [place] [address] Road. Telephone: [phone] Fax: [phone] Driving directions To [facility] and [facility]ascular [place]. Building: Driving WEST on [address], take exit [address] to [address].  Turn left onto [facility], then turn right into [employer] Parking lot Driving EAST on [address], take exit [address] [employer]. Turn right at the end of the exit ramp. Turn left onto [facility], then turn right into [employer] lot. Mayco Roque DO Cardiology, Interventional Cardiology In 2 weeks post hospitalization visit [address] [facility]  [address] [facility] [address] Muriel Torres MD Family Medicine   [address] [place] [address] [place] [address]  [phone]              ADDITIONAL CARE RECOMMENDATIONS: ***    DIET: Regular Diet        ACTIVITY: Activity as tolerated        DISCHARGE MEDICATIONS:   See Medication Reconciliation Form      NOTIFY YOUR PHYSICIAN FOR ANY OF THE FOLLOWING:   Fever over 101 degrees for 24 hours. Chest pain, shortness of breath, fever, chills, nausea, vomiting, diarrhea, change in mentation, falling, weakness, bleeding. Severe pain or pain not relieved by medications. Or, any other signs or symptoms that you may have questions about.     DISPOSITION:    Home With:family with self care   OT  PT  [facility]  RN       SNF/Inpatient Rehab/LTAC    Independent/assisted living    Hospice    Other:       PATIENT CONDITION AT DISCHARGE:     Functional status    Poor     Deconditioned    x Independent      Cognition     Lucid     Forgetful     Dementia      Catheters/lines (plus indication)    [place]     PICC     PEG    x None      Code status    x Full code     DNR      PHYSICAL EXAMINATION AT DISCHARGE:   Refer to Progress Note***      CHRONIC MEDICAL DIAGNOSES:  Problem List as of 12/10/2021 Never Reviewed          Codes Class Noted - Resolved    NSTEMI (non-ST elevated myocardial infarction) ([facility] [address] ICD-10-CM: I21.4  ICD-9-CM: 410.70  12/9/2021 - Present        Chest pain ICD-10-CM: R07.9  ICD-9-CM: 786.50  12/9/2021 - Present                        Signed:   Susy Gant MD  12/10/2021  4:17 PM

## 2021-12-10 NOTE — PROGRESS NOTES
Patient eating lunch and tolerating it without nausea. Dr. Bender Sayres spoke with patient earlier and plan is for patient to go home today. 1600 Hospitalist saw patient and is writing discharge orders. [address] with Dr. Jose García who stated that the echocardiogram can be done at the time of his follow up in the office. [place] removed and quik clot and tegederm dressing applied.  in and spoke with patient. Patient dressed self and is steady on his feet with no dizziness. 1645 Postoperative discharge instructions reviewed with patient and all questions answered. Patient verbalizes understanding. Patient dressed self and ambulating without difficulty. 1700 Patient discharged via wheelchair accompanied by wife.

## 2021-12-10 NOTE — PROGRESS NOTES
TRANSFER - IN REPORT:    Verbal report received from [facility] on Cordelia Fisher  being received from PCI for routine progression of care. Report consisted of patients Situation, Background, Assessment and Recommendations(SBAR). Information from the following report(s) Procedure Summary was reviewed with the receiving clinician. Opportunity for questions and clarification was provided. Assessment completed upon patients arrival to [address] [place] [address]. Cardiac Cath Lab Recovery Arrival Note:    Cordelia Fisher arrived to [facility] recovery area. Patient procedure= PCI. Patient on cardiac monitor, non-invasive blood pressure, SPO2 monitor. On room air. IV  of Normal saline on pump at 75 ml/hr. Patient status doing well without problems. Patient is A&Ox 4. Patient reports no pain. PROCEDURE SITE CHECK:    Procedure site:without any bleeding and no hematoma, no pain/discomfort reported at procedure site. No change in patient status. Continue to monitor patient and status.

## 2021-12-11 PROCEDURE — G0378 HOSPITAL OBSERVATION PER HR: HCPCS

## 2021-12-12 LAB
ATRIAL RATE: 47 BPM
ATRIAL RATE: 63 BPM
ATRIAL RATE: 65 BPM
ATRIAL RATE: 68 BPM
CALCULATED P AXIS, ECG09: 56 DEGREES
CALCULATED P AXIS, ECG09: 58 DEGREES
CALCULATED P AXIS, ECG09: 62 DEGREES
CALCULATED P AXIS, ECG09: 72 DEGREES
CALCULATED R AXIS, ECG10: 11 DEGREES
CALCULATED R AXIS, ECG10: 12 DEGREES
CALCULATED R AXIS, ECG10: 21 DEGREES
CALCULATED R AXIS, ECG10: 26 DEGREES
CALCULATED T AXIS, ECG11: 13 DEGREES
CALCULATED T AXIS, ECG11: 24 DEGREES
CALCULATED T AXIS, ECG11: 32 DEGREES
CALCULATED T AXIS, ECG11: 52 DEGREES
DIAGNOSIS, 93000: NORMAL
P-R INTERVAL, ECG05: 148 MS
P-R INTERVAL, ECG05: 152 MS
P-R INTERVAL, ECG05: 154 MS
P-R INTERVAL, ECG05: 170 MS
Q-T INTERVAL, ECG07: 392 MS
Q-T INTERVAL, ECG07: 404 MS
Q-T INTERVAL, ECG07: 412 MS
Q-T INTERVAL, ECG07: 482 MS
QRS DURATION, ECG06: 100 MS
QRS DURATION, ECG06: 100 MS
QRS DURATION, ECG06: 94 MS
QRS DURATION, ECG06: 98 MS
QTC CALCULATION (BEZET), ECG08: 401 MS
QTC CALCULATION (BEZET), ECG08: 420 MS
QTC CALCULATION (BEZET), ECG08: 426 MS
QTC CALCULATION (BEZET), ECG08: 438 MS
VENTRICULAR RATE, ECG03: 47 BPM
VENTRICULAR RATE, ECG03: 63 BPM
VENTRICULAR RATE, ECG03: 65 BPM
VENTRICULAR RATE, ECG03: 68 BPM

## 2021-12-13 ENCOUNTER — PATIENT OUTREACH (OUTPATIENT)
Dept: CASE MANAGEMENT | Age: 66
End: 2021-12-13

## 2021-12-13 NOTE — PROGRESS NOTES
Care Transitions Initial Call    Call within 2 business days of discharge: Yes     Patient: Tawanna Loredo Patient : 1955 MRN: [id number]    Last Discharge [address]       Complaint Diagnosis Description Type Department Provider    21 Chest Pain NSTEMI (non-ST elevated myocardial infarction) ([facility] [address] . .. ED to Hosp-Admission (Discharged) (ADMIT) Jaye Hamlin MD; Wu Norwood. .. Was this an external facility discharge? No     Challenges to be reviewed by the provider   Additional needs identified to be addressed with provider: yes    Wife reports that they do not have a script for Norvasc and that patient does not have. CTN will contact cards office for advice. Method of communication with provider : chart routing    Discussed COVID-19 related testing which was available at this time. Test results were negative. Patient informed of results, if available? yes     Advance Care Planning:   Does patient have an Advance Directive:  health care decision makers updated and currently not on file; education provided     Inpatient Readmission Risk score: Unplanned Readmit Risk Score: 3.5 ( )    Was this a readmission? no       Patients top risk factors for readmission: PCP relationship   Interventions to address risk factors: Education of patient/family/caregiver/guardian to support self-management-s/sx MI to call 911, AHA diet, monitor BP and Assessment and support for treatment adherence and medication management-contact cards office about Norvasc    Care Transition Nurse (CTN) contacted the family by telephone to perform post hospital discharge assessment. Verified name and  with family as identifiers. Provided introduction to self, and explanation of the CTN role. CTN reviewed discharge instructions, medical action plan and red flags with family who verbalized understanding. Were discharge instructions available to patient? yes.  Reviewed appropriate site of care based on symptoms and resources available to patient including: Specialist. Family given an opportunity to ask questions and does not have any further questions or concerns at this time. The family agrees to contact the PCP office for questions related to their healthcare. Medication reconciliation was performed with family, who verbalizes understanding of administration of home medications. Advised obtaining a 90-day supply of all daily and as-needed medications. Referral to Pharm D needed: no     Home Health/Outpatient orders at discharge: none      Durable Medical Equipment ordered at discharge: None    Covid Risk Education    Educated patient about risk for severe COVID-19 due to risk factors according to CDC guidelines. CTN reviewed discharge instructions, medical action plan and red flag symptoms with the family who verbalized understanding. Discussed COVID vaccination status: yes. Education provided on COVID-19 vaccination as appropriate. Discussed exposure protocols and quarantine with CDC Guidelines. Family was given an opportunity to verbalize any questions and concerns and agrees to contact CTN or health care provider for questions related to their healthcare. Discussed follow-up appointments. If no appointment was previously scheduled, appointment scheduling offered: yes. Is follow up appointment scheduled within 7 days of discharge? yes. [facility]s):   Future Appointments   Date Time Provider Staci Moctezuma   12/17/2021 10:00 AM Hawa Xavier MD [facility] [employer] [facility]     [facility] follow up appointment(s): Dr Bhaskar Guy, cards interventional--wife will call to schedule, PCP--wife will call to see if patient can still go to PCP since he hasnt been in 5 years, she will either re-establish or find new PCP for patient    Plan for follow-up call in 5-7 days based on severity of symptoms and risk factors.   Plan for next call: symptom management-CP and follow up appointment-PCP and Dr Bhaskar Guy  CTN provided contact information for future needs. Goals Addressed                 This Visit's Progress     Prevent complications post hospitalization. 12/13/21  CTN spoke to patient wife to review appts and discharge instructions/red flags:    PCP  Dr Nate Robertson wife, she states pt has not seen this MD for 5 years and may not be established anymore. She states she will call MD office to discuss this and will either reestablish or find new PCP. Patient is out of area for [facility]. CTN sent [facility] PCP list to patient email per wife request.    Dr Grace Steel, cards---wife aware of appt on 12/17 at 10:00    Dr Evelyne Venegas, cards/interventional---wife will call MD to schedule appt for 2 weeks    Cardiac Wellness---wife reports that they live in [facility], patient may not attend due to distance. He is currently going to [facility] that is 3 miles away from their home. Wife states patient goes everyday for 2 hours. He has been going since discharge but not overdoing. CTN instructed wife to let cards know about this so if they have recommendations they can discuss at appts.  Wife reports patient is monitoring BP/HR daily, is WNL.  Wife reports both he and she are compliant with low salt/low fat diet for a few years now. Patient watches his weight, does not smoke or drink alcohol.  Wife instructed to call 911 if CP or SOB returns.  Per patient cath site healed well, there is still a small bruise at site but no other concerns.     CTN will follow up with patient next week---michelle

## 2021-12-14 ENCOUNTER — TELEPHONE (OUTPATIENT)
Dept: CARDIOLOGY CLINIC | Age: 66
End: 2021-12-14

## 2021-12-14 NOTE — PROGRESS NOTES
Future Appointments   Date Time Provider Staci Pollardi   12/17/2021 10:00 AM MD JAQUELINE Doherty AMB      We will discuss his blood pressure medications when he comes to see us on Friday. For now if they do not have the Norvasc we can just have the use of blood pressure monitor for the next few days.

## 2021-12-14 NOTE — TELEPHONE ENCOUNTER
Attempted to reach patient by telephone. A message was left confirming date, time, location of hospital follow up.

## 2021-12-15 ENCOUNTER — PATIENT OUTREACH (OUTPATIENT)
Dept: CASE MANAGEMENT | Age: 66
End: 2021-12-15

## 2021-12-15 NOTE — PROGRESS NOTES
Goals      Prevent complications post hospitalization. 12/13/21  CTN spoke to patient wife to review appts and discharge instructions/red flags:    PCP  Dr Bismark Ro wife, she states pt has not seen this MD for 5 years and may not be established anymore. She states she will call MD office to discuss this and will either reestablish or find new PCP. Patient is out of area for [facility]. CTN sent [facility] PCP list to patient email per wife request.    Dr Kwauk Garduno, cards---wife aware of appt on 12/17 at 10:00    Dr Thom Zhou, cards/interventional---wife will call MD to schedule appt for 2 weeks    Cardiac Wellness---wife reports that they live in . [facility] 19, patient may not attend due to distance. He is currently going to [facility] that is 3 miles away from their home. Wife states patient goes everyday for 2 hours. He has been going since discharge but not overdoing. CTN instructed wife to let cards know about this so if they have recommendations they can discuss at appts.  Wife reports patient is monitoring BP/HR daily, is WNL.  Wife reports both he and she are compliant with low salt/low fat diet for a few years now. Patient watches his weight, does not smoke or drink alcohol.  Wife instructed to call 911 if CP or SOB returns.  Per patient cath site healed well, there is still a small bruise at site but no other concerns. CTN will follow up with patient next week---mkrsatish    12/15/21  CTN received message from Dr Kwaku Garduno that if patient BP is WNL that he can discuss any meds changes at appt. CTN relayed information to wife. She reports she was able to get pt in with his PCP but as a new patient, appt 1/4/2022. Wife also states Dr Dileep Watson appt is 1/7/2022 due to MD either being unavailable until then. Wife instructed to keep BP log and take to Cards appt.   ---mkrw

## 2021-12-17 ENCOUNTER — ANCILLARY PROCEDURE (OUTPATIENT)
Dept: CARDIOLOGY CLINIC | Age: 66
End: 2021-12-17
Payer: MEDICARE

## 2021-12-17 ENCOUNTER — OFFICE VISIT (OUTPATIENT)
Dept: CARDIOLOGY CLINIC | Age: 66
End: 2021-12-17
Payer: MEDICARE

## 2021-12-17 VITALS
HEIGHT: 70 IN | WEIGHT: 180 LBS | RESPIRATION RATE: 16 BRPM | BODY MASS INDEX: 25.77 KG/M2 | DIASTOLIC BLOOD PRESSURE: 70 MMHG | HEART RATE: 62 BPM | OXYGEN SATURATION: 98 % | SYSTOLIC BLOOD PRESSURE: 120 MMHG

## 2021-12-17 VITALS
HEIGHT: 70 IN | WEIGHT: 180 LBS | SYSTOLIC BLOOD PRESSURE: 120 MMHG | DIASTOLIC BLOOD PRESSURE: 70 MMHG | BODY MASS INDEX: 25.77 KG/M2

## 2021-12-17 DIAGNOSIS — I20.0 UNSTABLE ANGINA (HCC): ICD-10-CM

## 2021-12-17 DIAGNOSIS — I25.10 CORONARY ARTERY DISEASE INVOLVING NATIVE CORONARY ARTERY OF NATIVE HEART WITHOUT ANGINA PECTORIS: Primary | ICD-10-CM

## 2021-12-17 DIAGNOSIS — I21.4 NSTEMI (NON-ST ELEVATED MYOCARDIAL INFARCTION) (HCC): ICD-10-CM

## 2021-12-17 DIAGNOSIS — E78.00 HYPERCHOLESTEREMIA: ICD-10-CM

## 2021-12-17 DIAGNOSIS — R03.0 ELEVATED BP WITHOUT DIAGNOSIS OF HYPERTENSION: ICD-10-CM

## 2021-12-17 PROCEDURE — 99214 OFFICE O/P EST MOD 30 MIN: CPT | Performed by: SPECIALIST

## 2021-12-17 PROCEDURE — 1101F PT FALLS ASSESS-DOCD LE1/YR: CPT | Performed by: SPECIALIST

## 2021-12-17 PROCEDURE — 93005 ELECTROCARDIOGRAM TRACING: CPT | Performed by: SPECIALIST

## 2021-12-17 PROCEDURE — G8536 NO DOC ELDER MAL SCRN: HCPCS | Performed by: SPECIALIST

## 2021-12-17 PROCEDURE — G8419 CALC BMI OUT NRM PARAM NOF/U: HCPCS | Performed by: SPECIALIST

## 2021-12-17 PROCEDURE — G8510 SCR DEP NEG, NO PLAN REQD: HCPCS | Performed by: SPECIALIST

## 2021-12-17 PROCEDURE — 1111F DSCHRG MED/CURRENT MED MERGE: CPT | Performed by: SPECIALIST

## 2021-12-17 PROCEDURE — 93306 TTE W/DOPPLER COMPLETE: CPT | Performed by: SPECIALIST

## 2021-12-17 PROCEDURE — G8427 DOCREV CUR MEDS BY ELIG CLIN: HCPCS | Performed by: SPECIALIST

## 2021-12-17 PROCEDURE — 93010 ELECTROCARDIOGRAM REPORT: CPT | Performed by: SPECIALIST

## 2021-12-17 PROCEDURE — 3017F COLORECTAL CA SCREEN DOC REV: CPT | Performed by: SPECIALIST

## 2021-12-17 PROCEDURE — G0463 HOSPITAL OUTPT CLINIC VISIT: HCPCS | Performed by: SPECIALIST

## 2021-12-17 RX ORDER — KETOCONAZOLE 20 MG/ML
SHAMPOO TOPICAL
COMMUNITY
Start: 2021-12-08

## 2021-12-17 NOTE — PATIENT INSTRUCTIONS
We will see you back in 3 months with fasting labs one week prior.      Heart and Vascular [place] Draw [facility]:  [facility] [address] ,   [address]  Phone: [phone]  Monday- Friday 7:00 am- 5:00 pm    [place] Draw Site:  [facility], [facility]  Phone: [phone]  Monday-Friday 7:00 am- 5:00 pm

## 2021-12-17 NOTE — PROGRESS NOTES
Shirlene Gracia     1955       Thad Guido MD, [facility]  Date of Visit-12/17/2021   PCP is Lafrances Cranker., MD   [facility] and [facility] [place]  [facility]  HPI:  Shirlene Gracia is a 74 y.o. male   Pt was admitted 12/9/21 with NSTEMI. Three days of chest pressure when on the treadmill. Cardiac cath revealed severe mid LAD occlusion, extending to distal vessel with mild to moderate disease I other vessels. Underwent AMOL with 3x18 mm stent. Has not yet had an echo. Was discharged the next day from admission and placed on Brilinta for 12 months along with Crestor, ASA and Amlodipine. BP is 120/70 and I dont know if he started the Amlodipine. Discharge EKG showed T-wave abnormality. I have personally reviewed EKG and cath. Today. .. Overall the pt states he is doing well. Pt states that he walked a mile under 15 minutes in the past week, and reports that he felt significantly better. Denies chest pain, edema, syncope or shortness of breath at rest, has no tachycardia, palpitations or sense of arrhythmia. EKG NSR WNL normal axis and intervals   Assessment/Plan:    Patient Instructions   We will see you back in 3 months with fasting labs one week prior. Heart and Vascular [place] Draw Site:  [place] , [address] [place] [address]  Phone: [phone]  Monday- Friday 7:00 am- 5:00 pm    [place] Draw Site:  [address]  Phone: [phone]  Monday-Friday 7:00 am- 5:00 pm         1. Coronary artery disease involving native coronary artery of native heart without angina pectoris  Significant lesion at the LAD with sx's, has done well post stent. Continue Brilinta for one year if possible with DAPT post MI. We discussed improving diet though his exercise regiment is very good. We reviewed his other vessels. 2. Hypercholesteremia  Lipids on high potency statin as appropriate for secondary prevention.   Will check lipids in three months. 3. Elevated BP without diagnosis of hypertension  Previously on Amlodipine, BP normal. Has not restarted, and will not have to restart unless elevated. 4. NSTEMI (non-ST elevated myocardial infarction) ([facility] [address]  DAPT. Echo and EKG today, three months with lipids  EKG today shows Bi-phasic T-wave in lead V3, unchanged from hospital    F/u in 3 months     Impression:   1. Coronary artery disease involving native coronary artery of native heart without angina pectoris    2. Hypercholesteremia    3. Elevated BP without diagnosis of hypertension    4. NSTEMI (non-ST elevated myocardial infarction) [facility])       Cardiac History:   No specialty comments available. Future Appointments   Date Time Provider Staci Moctezuma   3/18/2022 10:00 AM MD JAQUELINE Gilliam  AMB        Patient Care Team:  Kamini Bermudez MD as PCP - General (Family Medicine)  Echo Aguiar RN as Care Transitions Nurse  Melissa Seals MD (Cardiology)  Gm Coelho DO (Cardiology)    ROS-except as noted above. . A complete cardiac and respiratory are reviewed and negative except as above ; Resp-denies wheezing  or productive cough,. Const- No unusual weight loss or fever; Neuro-no recent seizure or CVA ; GI- No BRBPR, abdom pain, bloating ; - no  hematuria   see supplement sheet, initialed and to be scanned by staff  Past Medical History:   Diagnosis Date    Coronary artery disease involving native coronary artery of native heart without angina pectoris 12/29/2021    Elevated BP without diagnosis of hypertension 12/29/2021    Hypercholesteremia 12/29/2021    NSTEM[facility] (non-ST elevated [facility] infarction) [facility] 12/9/2021      Social Hx= reports that he has never smoked. He has never used smokeless tobacco. He reports that he does not use drugs.      Exam and Labs:  /70   Pulse 62   Resp 16   Ht 5' 10\" (1.778 m)   Wt 180 lb (81.6 kg)   SpO2 98%   BMI 25.83 kg/m² Constitutional:  NAD, comfortable  Head: NC,AT. Eyes: No scleral icterus. Neck:  Neck supple. No JVD present. Throat: moist mucous membranes. Chest: Effort normal & normal respiratory excursion . Neurological: alert, conversant and oriented . Skin: Skin is not cold. No obvious systemic rash noted. Not diaphoretic. No erythema. Psychiatric:  Grossly normal mood and affect. Behavior appears normal. Extremities:  no clubbing or cyanosis. Abdomen: non distended    Lungs:breath sounds normal. No stridor. distress, wheezes or  Rales. Heart: normal rate, regular rhythm, normal S1, S2, no murmurs, rubs, clicks or gallops , PMI non displaced. Edema: Edema is none. Lab Results   Component Value Date/Time    Cholesterol, total 197 12/10/2021 07:01 AM    HDL Cholesterol 63 12/10/2021 07:01 AM    LDL, calculated 123.2 (H) 12/10/2021 07:01 AM    Triglyceride 54 12/10/2021 07:01 AM    CHOL/HDL Ratio 3.1 12/10/2021 07:01 AM     Lab Results   Component Value Date/Time    Sodium 140 12/10/2021 07:02 AM    Potassium 4.2 12/10/2021 07:02 AM    Chloride 110 (H) 12/10/2021 07:02 AM    CO2 27 12/10/2021 07:02 AM    Anion gap 3 (L) 12/10/2021 07:02 AM    Glucose 101 (H) 12/10/2021 07:02 AM    BUN 18 12/10/2021 07:02 AM    Creatinine 0.97 12/10/2021 07:02 AM    BUN/Creatinine ratio 19 12/10/2021 07:02 AM    GFR est AA >60 12/10/2021 07:02 AM    GFR est non-AA >60 12/10/2021 07:02 AM    Calcium 8.8 12/10/2021 07:02 AM      Wt Readings from Last 3 Encounters:   12/17/21 180 lb (81.6 kg)   12/17/21 180 lb (81.6 kg)   12/09/21 194 lb 7.1 oz (88.2 kg)      BP Readings from Last 3 Encounters:   12/17/21 120/70   12/17/21 120/70   12/10/21 122/69      Current Outpatient Medications   Medication Sig    ketoconazole (NIZORAL) 2 % shampoo     aspirin 81 mg chewable tablet Take 1 Tablet by mouth daily.  rosuvastatin (CRESTOR) 20 mg tablet Take 1 Tablet by mouth nightly.     ticagrelor (BRILINTA) 90 mg tablet Take 1 Tablet by mouth every twelve (12) hours every twelve (12) hours. No current facility-administered medications for this visit. Impression see above.       Written by Arleth Sorto, as dictated by Eliza Spencer MD.

## 2021-12-17 NOTE — Clinical Note
12/17/2021    Patient: Gabriel Bryan   YOB: 1955   Date of Visit: 12/17/2021     Linda Zeng MD  [address] [address]  Via Fax: [phone]    Dear Linda Zeng MD,      Thank you for referring Mr. Gabriel Bryan to [facility] for evaluation. My notes for this consultation are attached. If you have questions, please do not hesitate to call me. I look forward to following your patient along with you.       Sincerely,    David Montero MD

## 2021-12-19 LAB
ECHO AO ROOT DIAM: 3.3 CM
ECHO AO ROOT INDEX: 1.65 CM/M2
ECHO AV MEAN GRADIENT: 5 MMHG
ECHO AV MEAN VELOCITY: 1.1 M/S
ECHO AV PEAK GRADIENT: 8 MMHG
ECHO AV PEAK VELOCITY: 1.5 M/S
ECHO AV VELOCITY RATIO: 0.6
ECHO AV VTI: 26.8 CM
ECHO LA DIAMETER INDEX: 1.65 CM/M2
ECHO LA DIAMETER: 3.3 CM
ECHO LA TO AORTIC ROOT RATIO: 1
ECHO LA VOL 2C: 49 ML (ref 18–58)
ECHO LA VOL 4C: 81 ML (ref 18–58)
ECHO LA VOL BP: 64 ML (ref 18–58)
ECHO LA VOL/BSA BIPLANE: 32 ML/M2 (ref 16–28)
ECHO LA VOLUME AREA LENGTH: 66 ML
ECHO LA VOLUME INDEX A2C: 25 ML/M2 (ref 16–28)
ECHO LA VOLUME INDEX A4C: 41 ML/M2 (ref 16–28)
ECHO LA VOLUME INDEX AREA LENGTH: 33 ML/M2
ECHO LV E' LATERAL VELOCITY: 11 CM/S
ECHO LV E' SEPTAL VELOCITY: 5 CM/S
ECHO LV EDV A4C: 124 ML
ECHO LV EDV INDEX A4C: 62 ML/M2
ECHO LV EJECTION FRACTION A4C: 60 %
ECHO LV ESV A4C: 50 ML
ECHO LV ESV INDEX A4C: 25 ML/M2
ECHO LV FRACTIONAL SHORTENING: 30 % (ref 28–44)
ECHO LV INTERNAL DIMENSION DIASTOLE INDEX: 1.5 CM/M2
ECHO LV INTERNAL DIMENSION DIASTOLIC: 3 CM (ref 4.2–5.9)
ECHO LV INTERNAL DIMENSION SYSTOLIC INDEX: 1.05 CM/M2
ECHO LV INTERNAL DIMENSION SYSTOLIC: 2.1 CM
ECHO LV IVSD: 1 CM (ref 0.6–1)
ECHO LV MASS 2D: 95.1 G (ref 88–224)
ECHO LV MASS INDEX 2D: 47.6 G/M2 (ref 49–115)
ECHO LV POSTERIOR WALL DIASTOLIC: 1.2 CM (ref 0.6–1)
ECHO LV RELATIVE WALL THICKNESS RATIO: 0.8
ECHO LVOT AV VTI INDEX: 0.7
ECHO LVOT MEAN GRADIENT: 2 MMHG
ECHO LVOT PEAK GRADIENT: 3 MMHG
ECHO LVOT PEAK VELOCITY: 0.9 M/S
ECHO LVOT VTI: 18.7 CM
ECHO MV A VELOCITY: 0.84 M/S
ECHO MV AREA PHT: 2.4 CM2
ECHO MV E DECELERATION TIME (DT): 319 MS
ECHO MV E VELOCITY: 0.44 M/S
ECHO MV E/A RATIO: 0.52
ECHO MV E/E' LATERAL: 4
ECHO MV E/E' RATIO (AVERAGED): 6.4
ECHO MV E/E' SEPTAL: 8.8
ECHO MV PRESSURE HALF TIME (PHT): 92.5 MS
ECHO RA MAJOR AXIS INDEX: 2.05 CM/M2
ECHO RA MAJOR AXIS: 4.1 CM
ECHO RV FREE WALL PEAK S': 13 CM/S
ECHO RV INTERNAL DIMENSION: 4.3 CM
ECHO RV TAPSE: 2.3 CM (ref 1.5–2)

## 2021-12-19 PROCEDURE — 93306 TTE W/DOPPLER COMPLETE: CPT | Performed by: SPECIALIST

## 2021-12-19 NOTE — PROGRESS NOTES
Echo shows essentially normal pump function, cant quite see the apex so do a limited echo with Definity same day as his next appt  No valve issues  Overall good news, no damage to wall motion of heart is seen  Future Appointments  3/18/2022  10:00 AM   Thad Dorsey MD CAVREY BS AMB

## 2021-12-22 ENCOUNTER — PATIENT OUTREACH (OUTPATIENT)
Dept: CASE MANAGEMENT | Age: 66
End: 2021-12-22

## 2021-12-22 DIAGNOSIS — I21.4 NSTEMI (NON-ST ELEVATED MYOCARDIAL INFARCTION) (HCC): Primary | ICD-10-CM

## 2021-12-22 DIAGNOSIS — I25.10 CORONARY ARTERY DISEASE INVOLVING NATIVE CORONARY ARTERY OF NATIVE HEART WITHOUT ANGINA PECTORIS: ICD-10-CM

## 2021-12-22 NOTE — PROGRESS NOTES
Goals      Prevent complications post hospitalization. 12/13/21  CTN spoke to patient wife to review appts and discharge instructions/red flags:    PCP  Dr Mathis Samples wife, she states pt has not seen this MD for 5 years and may not be established anymore. She states she will call MD office to discuss this and will either reestablish or find new PCP. Patient is out of area for [facility]. CTN sent [employer] PCP list to patient email per wife request.    Dr Scott Mike, cards---wife aware of appt on 12/17 at 10:00    Dr Lucinda Bradford, cards/interventional---wife will call MD to schedule appt for 2 weeks    Cardiac Wellness---wife reports that they live in [facility], patient may not attend due to distance. He is currently going to [facility] that is 3 miles away from their home. Wife states patient goes everyday for 2 hours. He has been going since discharge but not overdoing. CTN instructed wife to let cards know about this so if they have recommendations they can discuss at appts.  Wife reports patient is monitoring BP/HR daily, is WNL.  Wife reports both he and she are compliant with low salt/low fat diet for a few years now. Patient watches his weight, does not smoke or drink alcohol.  Wife instructed to call 911 if CP or SOB returns.  Per patient cath site healed well, there is still a small bruise at site but no other concerns. CTN will follow up with patient next week---mkrw    12/15/21  CTN received message from Dr Scott Mike that if patient BP is WNL that he can discuss any meds changes at appt. CTN relayed information to wife. She reports she was able to get pt in with his PCP but as a new patient, appt 1/4/2022. Wife also states Dr Iraida Arthur appt is 1/7/2022 due to MD either being unavailable until then. Wife instructed to keep BP log and take to Cards appt. ---mkrw    12/22/21  CTN spoke to patient wife for updates.     She reports Patient has had no CP or SOB, is back at [facility] exercising almost every day. Patient saw Dr Spencer Barreto 12/17 and everything looked good with his echo. Pt will follow up with cards in March. Dr Aneudy Yee cancelled appt with Dr Ángela Huffman as he is now managing. CTN reviewed eating AHA diet, wife states she has cut back on fat even more now. Patient is OK with this. CTN discussed calling cards office to have [employer] medication financial assistance form completed as copay is over $400 for patient . I also instructed her to use coupon for free month if they haven't used one already. CTN suggested they ask for samples from the office as well.  Wife verbalizes understanding---michelle

## 2021-12-29 PROBLEM — I25.10 CORONARY ARTERY DISEASE INVOLVING NATIVE CORONARY ARTERY OF NATIVE HEART WITHOUT ANGINA PECTORIS: Status: ACTIVE | Noted: 2021-12-29

## 2021-12-29 PROBLEM — R03.0 ELEVATED BP WITHOUT DIAGNOSIS OF HYPERTENSION: Status: ACTIVE | Noted: 2021-12-29

## 2021-12-29 PROBLEM — E78.00 HYPERCHOLESTEREMIA: Status: ACTIVE | Noted: 2021-12-29

## 2021-12-29 PROBLEM — R07.9 CHEST PAIN: Status: RESOLVED | Noted: 2021-12-09 | Resolved: 2021-12-29

## 2022-01-01 DIAGNOSIS — E78.00 HYPERCHOLESTEREMIA: ICD-10-CM

## 2022-01-01 DIAGNOSIS — I25.10 CORONARY ARTERY DISEASE INVOLVING NATIVE CORONARY ARTERY OF NATIVE HEART WITHOUT ANGINA PECTORIS: Primary | ICD-10-CM

## 2022-01-03 RX ORDER — ROSUVASTATIN CALCIUM 20 MG/1
20 TABLET, COATED ORAL
Qty: 30 TABLET | Refills: 3 | Status: SHIPPED | OUTPATIENT
Start: 2022-01-03 | End: 2022-04-05

## 2022-01-03 NOTE — TELEPHONE ENCOUNTER
Per VO by Md.      Future Appointments   Date Time Provider Staci Moctezuma   3/18/2022 10:00 AM BERONICA MUNGUIA AMB   3/18/2022 11:00 AM Thad Dorsey MD [facility] BS AMB

## 2022-01-06 DIAGNOSIS — I25.10 CORONARY ARTERY DISEASE INVOLVING NATIVE CORONARY ARTERY OF NATIVE HEART WITHOUT ANGINA PECTORIS: Primary | ICD-10-CM

## 2022-01-06 DIAGNOSIS — I21.4 NSTEMI (NON-ST ELEVATED MYOCARDIAL INFARCTION) (HCC): ICD-10-CM

## 2022-01-12 ENCOUNTER — PATIENT OUTREACH (OUTPATIENT)
Dept: CASE MANAGEMENT | Age: 67
End: 2022-01-12

## 2022-01-12 NOTE — PROGRESS NOTES
Patient has graduated from the Transitions of Care Coordination  program on 1/12/2022. Patient/family has the ability to self-manage at this time Care management goals have been completed. Patient was not referred to the [facility] team for further management. Goals Addressed                 This Visit's Progress     Prevent complications post hospitalization. 12/13/21  CTN spoke to patient wife to review appts and discharge instructions/red flags:    PCP  Dr Gus Del Toro wife, she states pt has not seen this MD for 5 years and may not be established anymore. She states she will call MD office to discuss this and will either reestablish or find new PCP. Patient is out of area for [facility]. CTN sent [facility] PCP list to patient email per wife request.    Dr Vanda Keith, cards---wife aware of appt on 12/17 at 10:00    Dr Marycarmen Haro, cards/interventional---wife will call MD to schedule appt for 2 weeks    Cardiac Wellness---wife reports that they live in [facility] [address], patient may not attend due to distance. He is currently going to [facility] that is 3 miles away from their home. Wife states patient goes everyday for 2 hours. He has been going since discharge but not overdoing. CTN instructed wife to let cards know about this so if they have recommendations they can discuss at appts.  Wife reports patient is monitoring BP/HR daily, is WNL.  Wife reports both he and she are compliant with low salt/low fat diet for a few years now. Patient watches his weight, does not smoke or drink alcohol.  Wife instructed to call 911 if CP or SOB returns.  Per patient cath site healed well, there is still a small bruise at site but no other concerns. CTN will follow up with patient next week---mkrw    12/15/21  CTN received message from Dr Vanda Keith that if patient BP is WNL that he can discuss any meds changes at appt. CTN relayed information to wife.  She reports she was able to get pt in with his PCP but as a new patient, appt 1/4/2022. Wife also states Dr Joon Johnson appt is 1/7/2022 due to MD either being unavailable until then. Wife instructed to keep BP log and take to Cards appt. ---mkrw    12/22/21  CTN spoke to patient wife for updates. She reports Patient has had no CP or SOB, is back at [facility] exercising almost every day. Patient saw Dr Mike Zamudio 12/17 and everything looked good with his echo. Pt will follow up with cards in March. Dr Emily Hernandez cancelled appt with Dr Joon Johnson as he is now managing. CTN reviewed eating AHA diet, wife states she has cut back on fat even more now. Patient is OK with this. CTN discussed calling cards office to have [employer] medication financial assistance form completed as copay is over $400 for patient . I also instructed her to use coupon for free month if they haven't used one already. CTN suggested they ask for samples from the office as well. Wife verbalizes understanding---mkrw    01/12/22  CTN unable to reach pt on either contact number,LM on . CTN reviewed chart, patient demonstrates he is using MyChart to communicate with providers when needed. No evidence of readmission during 30 day CHOLO---mkrw                Patient has Care Transition Nurse's contact information for any further questions, concerns, or needs.   Patients upcoming visits:    Future Appointments   Date Time Provider Staci Moctezuma   3/18/2022 10:00 AM BERONICA MUNGUIA   3/18/2022 11:00 AM Tahd Dorsey MD [facility] AMB

## 2022-01-28 ENCOUNTER — TELEPHONE (OUTPATIENT)
Dept: CARDIOLOGY CLINIC | Age: 67
End: 2022-01-28

## 2022-01-28 NOTE — TELEPHONE ENCOUNTER
Patient has an appt schedueld 3/28 and has a dental appt on 3/29. Has questions about whether to keep the dental appt bc of blood thinners/stent.      [phone]

## 2022-01-28 NOTE — TELEPHONE ENCOUNTER
Verified patient with two types of identifiers. We discussed dental appointment. Let him know he may have routine cleanings and should not stop his DAPT. Patient verbalized understanding and will call with any other questions.

## 2022-02-12 LAB
CREATININE, EXTERNAL: 1.17
LDL-C, EXTERNAL: 50
PSA, EXTERNAL: 0.33

## 2022-03-18 PROBLEM — I21.4 NSTEMI (NON-ST ELEVATED MYOCARDIAL INFARCTION) (HCC): Status: ACTIVE | Noted: 2021-12-09

## 2022-03-18 PROBLEM — R03.0 ELEVATED BP WITHOUT DIAGNOSIS OF HYPERTENSION: Status: ACTIVE | Noted: 2021-12-29

## 2022-03-19 PROBLEM — I25.10 CORONARY ARTERY DISEASE INVOLVING NATIVE CORONARY ARTERY OF NATIVE HEART WITHOUT ANGINA PECTORIS: Status: ACTIVE | Noted: 2021-12-29

## 2022-03-19 PROBLEM — E78.00 HYPERCHOLESTEREMIA: Status: ACTIVE | Noted: 2021-12-29

## 2022-03-28 ENCOUNTER — ANCILLARY PROCEDURE (OUTPATIENT)
Dept: CARDIOLOGY CLINIC | Age: 67
End: 2022-03-28
Payer: MEDICARE

## 2022-03-28 ENCOUNTER — OFFICE VISIT (OUTPATIENT)
Dept: CARDIOLOGY CLINIC | Age: 67
End: 2022-03-28
Payer: MEDICARE

## 2022-03-28 VITALS — BODY MASS INDEX: 28.2 KG/M2 | WEIGHT: 197 LBS | HEIGHT: 70 IN

## 2022-03-28 VITALS
RESPIRATION RATE: 16 BRPM | HEART RATE: 58 BPM | HEIGHT: 70 IN | BODY MASS INDEX: 28.2 KG/M2 | OXYGEN SATURATION: 99 % | DIASTOLIC BLOOD PRESSURE: 80 MMHG | SYSTOLIC BLOOD PRESSURE: 146 MMHG | WEIGHT: 197 LBS

## 2022-03-28 DIAGNOSIS — I21.4 NSTEMI (NON-ST ELEVATED MYOCARDIAL INFARCTION) (HCC): ICD-10-CM

## 2022-03-28 DIAGNOSIS — I25.10 CORONARY ARTERY DISEASE INVOLVING NATIVE CORONARY ARTERY OF NATIVE HEART WITHOUT ANGINA PECTORIS: ICD-10-CM

## 2022-03-28 DIAGNOSIS — R03.0 ELEVATED BP WITHOUT DIAGNOSIS OF HYPERTENSION: ICD-10-CM

## 2022-03-28 DIAGNOSIS — E78.00 HYPERCHOLESTEREMIA: ICD-10-CM

## 2022-03-28 DIAGNOSIS — I25.10 CORONARY ARTERY DISEASE INVOLVING NATIVE CORONARY ARTERY OF NATIVE HEART WITHOUT ANGINA PECTORIS: Primary | ICD-10-CM

## 2022-03-28 PROCEDURE — 1101F PT FALLS ASSESS-DOCD LE1/YR: CPT | Performed by: SPECIALIST

## 2022-03-28 PROCEDURE — G8427 DOCREV CUR MEDS BY ELIG CLIN: HCPCS | Performed by: SPECIALIST

## 2022-03-28 PROCEDURE — 99214 OFFICE O/P EST MOD 30 MIN: CPT | Performed by: SPECIALIST

## 2022-03-28 PROCEDURE — 3017F COLORECTAL CA SCREEN DOC REV: CPT | Performed by: SPECIALIST

## 2022-03-28 PROCEDURE — G8536 NO DOC ELDER MAL SCRN: HCPCS | Performed by: SPECIALIST

## 2022-03-28 PROCEDURE — 93308 TTE F-UP OR LMTD: CPT | Performed by: SPECIALIST

## 2022-03-28 PROCEDURE — G8510 SCR DEP NEG, NO PLAN REQD: HCPCS | Performed by: SPECIALIST

## 2022-03-28 PROCEDURE — G0463 HOSPITAL OUTPT CLINIC VISIT: HCPCS | Performed by: SPECIALIST

## 2022-03-28 PROCEDURE — G8419 CALC BMI OUT NRM PARAM NOF/U: HCPCS | Performed by: SPECIALIST

## 2022-03-28 RX ORDER — CLOPIDOGREL BISULFATE 75 MG/1
75 TABLET ORAL DAILY
Qty: 90 TABLET | Refills: 3 | Status: SHIPPED | OUTPATIENT
Start: 2022-03-28

## 2022-03-28 RX ORDER — ACETAMINOPHEN 325 MG/1
TABLET ORAL AS NEEDED
COMMUNITY

## 2022-03-28 RX ADMIN — PERFLUTREN 1 ML: 6.52 INJECTION, SUSPENSION INTRAVENOUS at 08:47

## 2022-03-28 NOTE — PROGRESS NOTES
Amarjit Wade     1955       Thad Locke MD, [facility]  Date of Visit-3/28/2022   PCP is Dima Davis MD   [facility] [place]  [facility]  HPI:  Amarjit Wade is a 74 y.o. male   3 month f/u. · CAD  · NSTEMI  12/9/21 Three days of chest pressure when on the treadmill. Cardiac cath revealed severe mid LAD occlusion, extending to distal vessel with mild to moderate disease I other vessels. · AMOL mid LAD 3x18 mm stent,  placed on Brilinta , Crestor, ASA and Amlodipine. Today. .. Echo done  03/28/22 ECHO ADULT FOLLOW-UP OR LIMITED   Left Ventricle: Left ventricle size is normal. Normal wall thickness. Normal wall motion. Normal left ventricular systolic function. EF by 2D Simpsons Biplane is 55%. Normal diastolic function.   Aortic Valve: Valve structure is normal. Mild sclerosis of the aortic valve cusp. No significant stenosis, however there is a small increase in gradient across the valve.   Left Atrium: Left atrium is mildly dilated.   Contrast used: Definity    Pt had lipids done 2/9/22 along with other labs that show normal HGB, , LDL 50. Pt is accompanied by his wife. Overall the pt states he is doing well. He hasn't been able to walk fast recently but attributes it to his knee. His wife questions if he is allowed to take his antibiotic before his dental cleaning tomorrow. Denies chest pain, edema, syncope or shortness of breath at rest, has no tachycardia, palpitations or sense of arrhythmia. Assessment/Plan:     Patient Instructions   Please keep a blood pressure diary. Please check your blood pressure three times a week at different times. Be sure to make sure you are sitting still for at least five minutes with both feet flat on the floor and arm heart level. Please write down the blood pressure, heart rate, date and time. When you have 10-12 numbers send us a [employer] message or call us at [phone].  When you run out of Brilinita switch to Plavix. 1. Coronary artery disease involving native coronary artery of native heart without angina pectoris  NSTEMI 12/9/21, severe mid  LAD occlusion distal vessel with AMOL. Doing well with exercise regiment. Had mild residual  Disease on other vessels. Brilinta cost is high, will switch to Plavix and probably do longer term DAPT. 2. Hypercholesteremia  Lipids with PCP showed LDL 50. Numbers look good. 3. Elevated BP without diagnosis of hypertension  He needs to keep home diary. 10 numbers and report back. 4. NSTEMI (non-ST elevated myocardial infarction) ([facility] [address]  Continue DAPT for 30-36 months. Echo done today confirms normal function. F/u in 1 year     Impression:   1. Coronary artery disease involving native coronary artery of native heart without angina pectoris    2. Hypercholesteremia    3. Elevated BP without diagnosis of hypertension    4. NSTEMI (non-ST elevated myocardial infarction) [facility])       Cardiac History:   No specialty comments available. No future appointments. Patient Care Team:  Ashley Simms MD as PCP - General (Family Medicine)  Padma Adorno MD (Cardiology)  Darrion Fallon DO (Cardiology)    ROS-except as noted above. . A complete cardiac and respiratory are reviewed and negative except as above ; Resp-denies wheezing  or productive cough,. Const- No unusual weight loss or fever; Neuro-no recent seizure or CVA ; GI- No BRBPR, abdom pain, bloating ; - no  hematuria   see supplement sheet, initialed and to be scanned by staff  Past Medical History:   Diagnosis Date    Coronary artery disease involving native coronary artery of native heart without angina pectoris 12/29/2021    Elevated BP without diagnosis of hypertension 12/29/2021    Hypercholesteremia 12/29/2021    NSTEMI (non-ST elevated myocardial infarction) ([facility] [address] 12/9/2021      Social Hx= reports that he has never smoked.  He has never used smokeless tobacco. He reports current alcohol use of about 10.0 standard drinks of alcohol per week. He reports that he does not use drugs. Exam and Labs:  BP (!) 146/80 (BP 1 Location: Right arm, BP Patient Position: Sitting, BP Cuff Size: Adult)   Pulse (!) 58   Resp 16   Ht 5' 10\" (1.778 m)   Wt 197 lb (89.4 kg)   SpO2 99%   BMI 28.27 kg/m² Constitutional:  NAD, comfortable  Head: NC,AT. Eyes: No scleral icterus. Neck:  Neck supple. No JVD present. Throat: moist mucous membranes. Chest: Effort normal & normal respiratory excursion . Neurological: alert, conversant and oriented . Skin: Skin is not cold. No obvious systemic rash noted. Not diaphoretic. No erythema. Psychiatric:  Grossly normal mood and affect. Behavior appears normal. Extremities:  no clubbing or cyanosis. Abdomen: non distended    Lungs:breath sounds normal. No stridor. distress, wheezes or  Rales. Heart: normal rate, regular rhythm, normal S1, S2, no murmurs, rubs, clicks or gallops , PMI non displaced. Edema: Edema is none.   Lab Results   Component Value Date/Time    Cholesterol, total 197 12/10/2021 07:01 AM    HDL Cholesterol 63 12/10/2021 07:01 AM    LDL, calculated 123.2 (H) 12/10/2021 07:01 AM    Triglyceride 54 12/10/2021 07:01 AM    CHOL/HDL Ratio 3.1 12/10/2021 07:01 AM     Lab Results   Component Value Date/Time    Sodium 140 12/10/2021 07:02 AM    Potassium 4.2 12/10/2021 07:02 AM    Chloride 110 (H) 12/10/2021 07:02 AM    CO2 27 12/10/2021 07:02 AM    Anion gap 3 (L) 12/10/2021 07:02 AM    Glucose 101 (H) 12/10/2021 07:02 AM    BUN 18 12/10/2021 07:02 AM    Creatinine 0.97 12/10/2021 07:02 AM    BUN/Creatinine ratio 19 12/10/2021 07:02 AM    GFR est AA >60 12/10/2021 07:02 AM    GFR est non-AA >60 12/10/2021 07:02 AM    Calcium 8.8 12/10/2021 07:02 AM      Wt Readings from Last 3 Encounters:   03/28/22 197 lb (89.4 kg)   03/28/22 197 lb (89.4 kg)   12/17/21 180 lb (81.6 kg)      BP Readings from Last 3 Encounters:   03/28/22 (!) 146/80 12/17/21 120/70   12/17/21 120/70      Current Outpatient Medications   Medication Sig    acetaminophen (TylenoL) 325 mg tablet Take  by mouth as needed for Pain.  ticagrelor (Brilinta) 90 mg tablet Take 1 Tablet by mouth two (2) times a day.  rosuvastatin (CRESTOR) 20 mg tablet Take 1 Tablet by mouth nightly.  ketoconazole (NIZORAL) 2 % shampoo     aspirin 81 mg chewable tablet Take 1 Tablet by mouth daily. No current facility-administered medications for this visit. Impression see above.       Written by Wendelin Hashimoto, as dictated by Margaret Pang MD.

## 2022-03-28 NOTE — Clinical Note
3/28/2022    Patient: Mitzi Marie   YOB: 1955   Date of Visit: 3/28/2022     Delfino Ojeda MD  [address] [address]  Via Fax: [phone]    Dear Delfino Ojeda MD,      Thank you for referring Mr. Mitzi Marie to [facility] for evaluation. My notes for this consultation are attached. If you have questions, please do not hesitate to call me. I look forward to following your patient along with you.       Sincerely,    Kellie Alfaro MD

## 2022-03-28 NOTE — PATIENT INSTRUCTIONS
Please keep a blood pressure diary. Please check your blood pressure three times a week at different times. Be sure to make sure you are sitting still for at least five minutes with both feet flat on the floor and arm heart level. Please write down the blood pressure, heart rate, date and time. When you have 10-12 numbers send us a [employer] message or call us at [phone]. When you run out of Brilinita switch to Plavix.

## 2022-03-30 LAB
ECHO AO ASC DIAM: 2.9 CM
ECHO AO ASCENDING AORTA INDEX: 1.4 CM/M2
ECHO AO ROOT DIAM: 3.3 CM
ECHO AO ROOT INDEX: 1.59 CM/M2
ECHO AV AREA PEAK VELOCITY: 2.5 CM2
ECHO AV AREA VTI: 3 CM2
ECHO AV AREA/BSA PEAK VELOCITY: 1.2 CM2/M2
ECHO AV AREA/BSA VTI: 1.4 CM2/M2
ECHO AV MEAN GRADIENT: 5 MMHG
ECHO AV MEAN VELOCITY: 1 M/S
ECHO AV PEAK GRADIENT: 9 MMHG
ECHO AV PEAK VELOCITY: 1.5 M/S
ECHO AV VELOCITY RATIO: 0.6
ECHO AV VTI: 30.7 CM
ECHO LA DIAMETER INDEX: 1.98 CM/M2
ECHO LA DIAMETER: 4.1 CM
ECHO LA TO AORTIC ROOT RATIO: 1.24
ECHO LV E' LATERAL VELOCITY: 9 CM/S
ECHO LV E' SEPTAL VELOCITY: 6 CM/S
ECHO LV EDV A2C: 91 ML
ECHO LV EDV A4C: 119 ML
ECHO LV EDV BP: 106 ML (ref 67–155)
ECHO LV EDV INDEX A4C: 57 ML/M2
ECHO LV EDV INDEX BP: 51 ML/M2
ECHO LV EDV NDEX A2C: 44 ML/M2
ECHO LV EJECTION FRACTION A2C: 59 %
ECHO LV EJECTION FRACTION A4C: 54 %
ECHO LV EJECTION FRACTION BIPLANE: 55 % (ref 55–100)
ECHO LV ESV A2C: 37 ML
ECHO LV ESV A4C: 55 ML
ECHO LV ESV BP: 47 ML (ref 22–58)
ECHO LV ESV INDEX A2C: 18 ML/M2
ECHO LV ESV INDEX A4C: 27 ML/M2
ECHO LV ESV INDEX BP: 23 ML/M2
ECHO LV FRACTIONAL SHORTENING: 27 % (ref 28–44)
ECHO LV INTERNAL DIMENSION DIASTOLE INDEX: 1.59 CM/M2
ECHO LV INTERNAL DIMENSION DIASTOLIC: 3.3 CM (ref 4.2–5.9)
ECHO LV INTERNAL DIMENSION SYSTOLIC INDEX: 1.16 CM/M2
ECHO LV INTERNAL DIMENSION SYSTOLIC: 2.4 CM
ECHO LV IVSD: 1.5 CM (ref 0.6–1)
ECHO LV MASS 2D: 188.8 G (ref 88–224)
ECHO LV MASS INDEX 2D: 91.2 G/M2 (ref 49–115)
ECHO LV POSTERIOR WALL DIASTOLIC: 1.6 CM (ref 0.6–1)
ECHO LV RELATIVE WALL THICKNESS RATIO: 0.97
ECHO LVOT AREA: 4.5 CM2
ECHO LVOT AV VTI INDEX: 0.68
ECHO LVOT DIAM: 2.4 CM
ECHO LVOT MEAN GRADIENT: 2 MMHG
ECHO LVOT PEAK GRADIENT: 3 MMHG
ECHO LVOT PEAK VELOCITY: 0.9 M/S
ECHO LVOT STROKE VOLUME INDEX: 45.4 ML/M2
ECHO LVOT SV: 94 ML
ECHO LVOT VTI: 20.8 CM
ECHO MV A VELOCITY: 0.65 M/S
ECHO MV E DECELERATION TIME (DT): 326.1 MS
ECHO MV E VELOCITY: 0.5 M/S
ECHO MV E/A RATIO: 0.77
ECHO MV E/E' LATERAL: 5.56
ECHO MV E/E' RATIO (AVERAGED): 6.94
ECHO MV E/E' SEPTAL: 8.33
ECHO PULMONARY ARTERY END DIASTOLIC PRESSURE: 4 MMHG
ECHO PV MAX VELOCITY: 1.1 M/S
ECHO PV PEAK GRADIENT: 5 MMHG
ECHO PV REGURGITANT MAX VELOCITY: 1 M/S
ECHO RA MINOR AXIS INDEX: 2.08 CM/M2
ECHO RA MINOR AXIS: 4.3 CM
ECHO RV INTERNAL DIMENSION: 4.1 CM
ECHO RV TAPSE: 2.6 CM (ref 1.5–2)

## 2022-03-30 PROCEDURE — 93325 DOPPLER ECHO COLOR FLOW MAPG: CPT | Performed by: SPECIALIST

## 2022-03-30 PROCEDURE — 93308 TTE F-UP OR LMTD: CPT | Performed by: SPECIALIST

## 2022-03-30 PROCEDURE — 93321 DOPPLER ECHO F-UP/LMTD STD: CPT | Performed by: SPECIALIST

## 2022-04-04 ENCOUNTER — TELEPHONE (OUTPATIENT)
Dept: CARDIOLOGY CLINIC | Age: 67
End: 2022-04-04

## 2022-04-04 DIAGNOSIS — E78.00 HYPERCHOLESTEREMIA: ICD-10-CM

## 2022-04-04 DIAGNOSIS — I25.10 CORONARY ARTERY DISEASE INVOLVING NATIVE CORONARY ARTERY OF NATIVE HEART WITHOUT ANGINA PECTORIS: ICD-10-CM

## 2022-04-04 NOTE — TELEPHONE ENCOUNTER
Patient's wife called to speak with the nurse about the patient's clopidagrel, the pharmacist alarmed the wife that the medicine could be very dangerous and she has questions about it before the patient start the medication, please advise        Nehal Etienne  [phone]

## 2022-04-05 RX ORDER — ROSUVASTATIN CALCIUM 20 MG/1
20 TABLET, COATED ORAL
Qty: 90 TABLET | Refills: 1 | Status: SHIPPED | OUTPATIENT
Start: 2022-04-05 | End: 2022-10-03

## 2022-04-05 NOTE — TELEPHONE ENCOUNTER
Cardiologist: Dr. Clover Johnson    Last appt: 3/28/2022  Future Appointments   Date Time Provider Staci Pollardi   3/27/2023  8:20 AM MD JAQUELINE Murcia BS AMB       Requested Prescriptions     Signed Prescriptions Disp Refills    rosuvastatin (CRESTOR) 20 mg tablet 90 Tablet 1     Sig: TAKE 1 TABLET BY MOUTH NIGHTLY     Authorizing Provider: Nathan Dorantes     Ordering User: LUCHO THURSTON         Refalek VO per Dr. Angel Mcfadden.

## 2022-04-05 NOTE — TELEPHONE ENCOUNTER
TC to pt's wife, on HIPAA, ID verified. States she was told by her pharmacist that he  could be walking down the street and just start bleeding, and that it was a very dangerous medication. Advised tht with taking this medications you do need to watch for excessive bleeding, but not spontaneous bleeding. She would like to know if the Chen Deist would be any safer for him. Advised they do similar things, but she would like to know which is safer. Advised I would contact one of Dr. Dante Green colleagues and get back with her. Per VO from Dr. Adrian Delacruz, as far as bleeding risk they are the same, Brilinta has been known to cause shortness of breath. TC to pt's wife, on HIPAA, ID verified. Advised as above. She understands. Advised she call if her  has any problems. No further questions.

## 2022-10-02 DIAGNOSIS — E78.00 HYPERCHOLESTEREMIA: ICD-10-CM

## 2022-10-02 DIAGNOSIS — I25.10 CORONARY ARTERY DISEASE INVOLVING NATIVE CORONARY ARTERY OF NATIVE HEART WITHOUT ANGINA PECTORIS: ICD-10-CM

## 2022-10-03 RX ORDER — ROSUVASTATIN CALCIUM 20 MG/1
20 TABLET, COATED ORAL
Qty: 90 TABLET | Refills: 3 | Status: SHIPPED | OUTPATIENT
Start: 2022-10-03

## 2022-10-03 NOTE — TELEPHONE ENCOUNTER
Per VO by MD.    Future Appointments   Date Time Provider Staci Rhianna   3/27/2023  8:20 AM MD JAQUELINE Chapman AMB

## 2023-03-28 ENCOUNTER — OFFICE VISIT (OUTPATIENT)
Facility: CLINIC | Age: 68
End: 2023-03-28

## 2023-03-28 VITALS
DIASTOLIC BLOOD PRESSURE: 80 MMHG | WEIGHT: 187 LBS | SYSTOLIC BLOOD PRESSURE: 130 MMHG | RESPIRATION RATE: 16 BRPM | HEART RATE: 55 BPM | BODY MASS INDEX: 26.77 KG/M2 | HEIGHT: 70 IN | OXYGEN SATURATION: 98 %

## 2023-03-28 DIAGNOSIS — I25.10 CORONARY ARTERY DISEASE INVOLVING NATIVE CORONARY ARTERY OF NATIVE HEART WITHOUT ANGINA PECTORIS: Primary | ICD-10-CM

## 2023-03-28 DIAGNOSIS — I21.4 NSTEMI (NON-ST ELEVATED MYOCARDIAL INFARCTION) (HCC): ICD-10-CM

## 2023-03-28 DIAGNOSIS — E78.00 HYPERCHOLESTEREMIA: ICD-10-CM

## 2023-03-28 DIAGNOSIS — R03.0 ELEVATED BP WITHOUT DIAGNOSIS OF HYPERTENSION: ICD-10-CM

## 2023-03-28 RX ORDER — ROSUVASTATIN CALCIUM 10 MG/1
10 TABLET, COATED ORAL
Qty: 90 TABLET | Refills: 3 | Status: SHIPPED | OUTPATIENT
Start: 2023-03-28

## 2023-03-28 NOTE — PATIENT INSTRUCTIONS
You have been scheduled for a stress echocardiogram the same day as your annual follow up. Please arrive 15 minutes prior to your appointment. Wear comfortable clothes and shoes.  Please do not eat or drink anything other than water two hours prior to test.

## 2023-03-28 NOTE — PROGRESS NOTES
Elaine Pearson     1955       Thad Humphrey MD, [facility]  Date of Visit-3/28/2023   PCP is Freddy Vo MD   [facility] [place]  [facility]  HPI:  Elaine Pearson is a 69 y.o. male   1 year follow up   CAD  NSTEMI  12/9/21 Three days of chest pressure when on the treadmill. Cardiac cath revealed severe mid LAD occlusion, extending to distal vessel with mild to moderate disease I other vessels. AMOL mid LAD 3x18 mm stent,  placed on Brilinta , Crestor, ASA and Amlodipine. 03/28/22 ECHO ADULT FOLLOW-UP OR LIMITED   Left Ventricle: Left ventricle size is normal. Normal wall thickness. Normal wall motion. Normal left ventricular systolic function. EF by 2D Simpsons Biplane is 55%. Normal diastolic function. Aortic Valve: Valve structure is normal. Mild sclerosis of the aortic valve cusp. No significant stenosis, however there is a small increase in gradient across the valve. Left Atrium: Left atrium is mildly dilated. Contrast used: Definity  Pt had lipids done 2/9/22 along with other labs that show normal HGB, , LDL 50. Today--Here today with his wife. He is very physically active does a lot of exercise at the gym in fact that so he found the symptoms in the first place. He had no chest pain or shortness of breath. He had numbers done with Dr. Milton Degroot that showed an LDL cholesterol of 56. He is been taken the prescription now as a half a tablet. Denies chest pain, edema, syncope or shortness of breath at rest   Has no tachycardia , palpitations or sense of arrythmia    EKG  sinus bradycardia  WNL normal axis and intervals   Assessment/Plan:     stable doing very well high level of exercise without difficulty. We went over the lab work from Dr. Milton Degroot.   The potassium was hemolyzed otherwise everything looked pretty good he is doing fine at goal on the 10 mg and seems to have a little less ache so we will switch to 10 mg daily we will see him back in a year and do a stress echo at that time he will continue Plavix for 36 months post intervention. EKG shows sinus bradycardia within normal limits. Patient Instructions   You have been scheduled for a stress echocardiogram the same day as your annual follow up. Please arrive 15 minutes prior to your appointment. Wear comfortable clothes and shoes. Please do not eat or drink anything other than water two hours prior to test.    1. Coronary artery disease involving native coronary artery of native heart without angina pectoris  NSTEMI 12/9/21, severe mid  LAD occlusion distal vessel with AMOL. Doing well with exercise regiment. Had mild residual  Disease on other vessels. Brilinta cost is high, will switch to Plavix and probably do longer term DAPT. 2. Hypercholesteremia  Lipids with PCP showed LDL 50 in 2022  , was 122 before statin   Lipids on high potency statin as appropriate for secondary prevention. At goal , denies excess muscle aches or new liver issues  Key Antihyperlipidemia Meds               rosuvastatin (CRESTOR) 20 mg tablet (Taking) TAKE 1 TABLET BY MOUTH NIGHTLY               3. Elevated BP without diagnosis of hypertension  At goal , meds and possible side effects reviewed and patient denies  Key CAD CHF Meds               rosuvastatin (CRESTOR) 20 mg tablet (Taking) TAKE 1 TABLET BY MOUTH NIGHTLY    clopidogreL (Plavix) 75 mg tab (Taking) Take 1 Tablet by mouth daily. aspirin 81 mg chewable tablet (Taking) Take 1 Tablet by mouth daily. BP Readings from Last 6 Encounters:   03/28/23 130/80   03/28/22 (!) 146/80   12/17/21 120/70   12/17/21 120/70   12/10/21 122/69          4. NSTEMI (non-ST elevated myocardial infarction) ([address]  Continue DAPT for 30-36 months. Echo  confirms normal function. Impression:   1. Coronary artery disease involving native coronary artery of native heart without angina pectoris    2. Hypercholesteremia    3.  Elevated BP without diagnosis of hypertension    4. NSTEMI (non-ST elevated myocardial infarction) [facility])       Cardiac History:   No specialty comments available. Future Appointments   Date Time Provider Staci Moctezuma   3/25/2024  9:00 AM ECHO, BERONICA PARSONS BS AMB   3/25/2024  9:00 AM STRESSECHBERONICA CASTILLO AMB   3/25/2024  9:40 AM [facility]        Patient Care Team:  Zain Garcia MD as PCP - General (Family Medicine)  Maria Isabel Faustin MD (Cardiovascular Disease Physician)  Niurka Ramos DO (Cardiovascular Disease Physician)    ROS-except as noted above. . A complete cardiac and respiratory are reviewed and negative except as above ; Resp-denies wheezing  or productive cough,. Const- No unusual weight loss or fever; Neuro-no recent seizure or CVA ; GI- No BRBPR, abdom pain, bloating ; - no  hematuria   see supplement sheet, initialed and to be scanned by staff  Past Medical History:   Diagnosis Date    Coronary artery disease involving native coronary artery of native heart without angina pectoris 12/29/2021    Elevated BP without diagnosis of hypertension 12/29/2021    H/O colonoscopy 08/30/2016    Hypercholesteremia 12/29/2021    NSTEMI (non-ST elevated myocardial infarction) ([facility] [address] 12/9/2021    Physical exam, routine 02/09/2022      Social Hx= reports that he has never smoked. He has never used smokeless tobacco. He reports current alcohol use of about 10.0 standard drinks per week. He reports that he does not use drugs. Exam and Labs:  /80   Pulse (!) 55   Resp 16   Ht 5' 10\" (1.778 m)   Wt 187 lb (84.8 kg)   SpO2 98%   BMI 26.83 kg/m² Constitutional:  NAD, comfortable  Head: NC,AT. Eyes: No scleral icterus. Neck:  Neck supple. No JVD present. Throat: moist mucous membranes. Chest: Effort normal & normal respiratory excursion . Neurological: alert, conversant and oriented . Skin: Skin is not cold. No obvious systemic rash noted. Not diaphoretic. No erythema. Psychiatric:  Grossly normal mood and affect. Behavior appears normal. Extremities:  no clubbing or cyanosis. Abdomen: non distended    Lungs:breath sounds normal. No stridor. distress, wheezes or  Rales. Heart: normal rate, regular rhythm, normal S1, S2, no murmurs, rubs, clicks or gallops , PMI non displaced. Edema: Edema is none. Lab Results   Component Value Date/Time    Cholesterol, total 197 12/10/2021 07:01 AM    HDL Cholesterol 63 12/10/2021 07:01 AM    LDL, calculated 123.2 (H) 12/10/2021 07:01 AM    Triglyceride 54 12/10/2021 07:01 AM    CHOL/HDL Ratio 3.1 12/10/2021 07:01 AM     Lab Results   Component Value Date/Time    Sodium 140 12/10/2021 07:02 AM    Potassium 4.2 12/10/2021 07:02 AM    Chloride 110 (H) 12/10/2021 07:02 AM    CO2 27 12/10/2021 07:02 AM    Anion gap 3 (L) 12/10/2021 07:02 AM    Glucose 101 (H) 12/10/2021 07:02 AM    BUN 18 12/10/2021 07:02 AM    Creatinine 0.97 12/10/2021 07:02 AM    BUN/Creatinine ratio 19 12/10/2021 07:02 AM    GFR est AA >60 12/10/2021 07:02 AM    GFR est non-AA >60 12/10/2021 07:02 AM    Calcium 8.8 12/10/2021 07:02 AM      Wt Readings from Last 3 Encounters:   03/28/23 187 lb (84.8 kg)   03/28/22 197 lb (89.4 kg)   03/28/22 197 lb (89.4 kg)      BP Readings from Last 3 Encounters:   03/28/23 130/80   03/28/22 (!) 146/80   12/17/21 120/70      Current Outpatient Medications   Medication Sig    rosuvastatin (CRESTOR) 20 mg tablet TAKE 1 TABLET BY MOUTH NIGHTLY    clopidogreL (Plavix) 75 mg tab Take 1 Tablet by mouth daily. aspirin 81 mg chewable tablet Take 1 Tablet by mouth daily. acetaminophen (TYLENOL) 325 mg tablet Take  by mouth as needed for Pain. (Patient not taking: Reported on 3/28/2023)    ketoconazole (NIZORAL) 2 % shampoo  (Patient not taking: Reported on 3/28/2023)     No current facility-administered medications for this visit. Impression see above. This note was created using voice recognition software.  Despite editing, there may be syntax errors.

## 2023-03-28 NOTE — Clinical Note
3/28/2023    Patient: Kenya Rodriguez   YOB: 1955   Date of Visit: 3/28/2023     Tricia Esparza MD   [address] [address]  Via Fax: [phone]    Dear Tricia Esparza MD,      Thank you for referring Mr. Kenya Rodriguez to [address] [place] [address] for evaluation. My notes for this consultation are attached. If you have questions, please do not hesitate to call me. I look forward to following your patient along with you.       Sincerely,    Jody Mcqueen MD

## 2023-04-01 DIAGNOSIS — I25.10 CORONARY ARTERY DISEASE INVOLVING NATIVE CORONARY ARTERY OF NATIVE HEART WITHOUT ANGINA PECTORIS: ICD-10-CM

## 2023-04-03 RX ORDER — CLOPIDOGREL BISULFATE 75 MG/1
TABLET ORAL
Qty: 90 TABLET | Refills: 3 | Status: SHIPPED | OUTPATIENT
Start: 2023-04-03

## 2023-04-03 NOTE — TELEPHONE ENCOUNTER
Per VO by MD.    Future Appointments   Date Time Provider Staci Moctezuma   3/25/2024  9:00 AM BERONICA MISHRA AMB   3/25/2024  9:00 AM BERONICA CATHERINE AMB   3/25/2024  9:40 AM Thad Dorsey MD CAVREY BS AMB

## 2024-03-29 DIAGNOSIS — I25.10 ATHEROSCLEROTIC HEART DISEASE OF NATIVE CORONARY ARTERY WITHOUT ANGINA PECTORIS: ICD-10-CM

## 2024-03-29 DIAGNOSIS — E78.00 PURE HYPERCHOLESTEROLEMIA, UNSPECIFIED: ICD-10-CM

## 2024-03-29 RX ORDER — CLOPIDOGREL BISULFATE 75 MG/1
75 TABLET ORAL DAILY
Qty: 90 TABLET | Refills: 3 | Status: SHIPPED | OUTPATIENT
Start: 2024-03-29

## 2024-03-29 RX ORDER — ROSUVASTATIN CALCIUM 10 MG/1
TABLET, COATED ORAL NIGHTLY
Qty: 90 TABLET | Refills: 3 | Status: SHIPPED | OUTPATIENT
Start: 2024-03-29

## 2024-03-29 NOTE — TELEPHONE ENCOUNTER
Per VO by MD.    Future Appointments   Date Time Provider Department Center   4/22/2024 11:00 AM BSROSA EDWARDS ECHO 2 [facility]   4/22/2024 12:00 PM Luistio Myers MD C[facility] BS AMB

## 2024-04-22 ENCOUNTER — ANCILLARY PROCEDURE (OUTPATIENT)
Age: 69
End: 2024-04-22
Payer: MEDICARE

## 2024-04-22 ENCOUNTER — OFFICE VISIT (OUTPATIENT)
Age: 69
End: 2024-04-22
Payer: MEDICARE

## 2024-04-22 VITALS
SYSTOLIC BLOOD PRESSURE: 128 MMHG | WEIGHT: 192 LBS | DIASTOLIC BLOOD PRESSURE: 70 MMHG | HEART RATE: 60 BPM | HEIGHT: 70 IN | OXYGEN SATURATION: 98 % | BODY MASS INDEX: 27.49 KG/M2

## 2024-04-22 VITALS
HEIGHT: 70 IN | DIASTOLIC BLOOD PRESSURE: 70 MMHG | BODY MASS INDEX: 27.49 KG/M2 | SYSTOLIC BLOOD PRESSURE: 128 MMHG | WEIGHT: 192 LBS | HEART RATE: 72 BPM

## 2024-04-22 DIAGNOSIS — M10.00 IDIOPATHIC GOUT, UNSPECIFIED CHRONICITY, UNSPECIFIED SITE: ICD-10-CM

## 2024-04-22 DIAGNOSIS — I25.10 CAD (CORONARY ARTERY DISEASE): ICD-10-CM

## 2024-04-22 DIAGNOSIS — R03.0 ELEVATED BLOOD-PRESSURE READING, WITHOUT DIAGNOSIS OF HYPERTENSION: ICD-10-CM

## 2024-04-22 DIAGNOSIS — I21.4 NSTEMI (NON-ST ELEVATED MYOCARDIAL INFARCTION) (HCC): ICD-10-CM

## 2024-04-22 DIAGNOSIS — I25.10 CORONARY ARTERY DISEASE INVOLVING NATIVE CORONARY ARTERY OF NATIVE HEART WITHOUT ANGINA PECTORIS: ICD-10-CM

## 2024-04-22 DIAGNOSIS — I21.4 NON-ST ELEVATION (NSTEMI) MYOCARDIAL INFARCTION (HCC): Primary | ICD-10-CM

## 2024-04-22 DIAGNOSIS — E78.00 PURE HYPERCHOLESTEROLEMIA, UNSPECIFIED: ICD-10-CM

## 2024-04-22 DIAGNOSIS — E78.00 HYPERCHOLESTEREMIA: ICD-10-CM

## 2024-04-22 LAB
ECHO AO ASC DIAM: 3.3 CM
ECHO AO ASCENDING AORTA INDEX: 1.61 CM/M2
ECHO BSA: 2.07 M2
STRESS ANGINA INDEX: 0
STRESS BASELINE DIAS BP: 70 MMHG
STRESS BASELINE HR: 71 BPM
STRESS BASELINE ST DEPRESSION: 0 MM
STRESS BASELINE SYS BP: 128 MMHG
STRESS ESTIMATED WORKLOAD: 15 METS
STRESS EXERCISE DUR MIN: 9 MIN
STRESS EXERCISE DUR SEC: 44 SEC
STRESS O2 SAT PEAK: 95 %
STRESS O2 SAT REST: 98 %
STRESS PEAK DIAS BP: 76 MMHG
STRESS PEAK SYS BP: 162 MMHG
STRESS PERCENT HR ACHIEVED: 102 %
STRESS POST PEAK HR: 153 BPM
STRESS RATE PRESSURE PRODUCT: NORMAL BPM*MMHG
STRESS SR DUKE TREADMILL SCORE: 10
STRESS ST DEPRESSION: 0 MM
STRESS STAGE 1 DURATION: 3 MIN:SEC
STRESS STAGE 1 HR: 95 BPM
STRESS STAGE 2 DURATION: 3 MIN:SEC
STRESS STAGE 2 HR: 115 BPM
STRESS STAGE 3 DURATION: 3 MIN:SEC
STRESS STAGE 3 HR: 136 BPM
STRESS STAGE 4 DURATION: NORMAL MIN:SEC
STRESS STAGE 4 HR: 153 BPM
STRESS TARGET HR: 150 BPM

## 2024-04-22 PROCEDURE — 4004F PT TOBACCO SCREEN RCVD TLK: CPT | Performed by: SPECIALIST

## 2024-04-22 PROCEDURE — 93351 STRESS TTE COMPLETE: CPT | Performed by: SPECIALIST

## 2024-04-22 PROCEDURE — G8419 CALC BMI OUT NRM PARAM NOF/U: HCPCS | Performed by: SPECIALIST

## 2024-04-22 PROCEDURE — 99214 OFFICE O/P EST MOD 30 MIN: CPT | Performed by: SPECIALIST

## 2024-04-22 PROCEDURE — 3017F COLORECTAL CA SCREEN DOC REV: CPT | Performed by: SPECIALIST

## 2024-04-22 PROCEDURE — G8428 CUR MEDS NOT DOCUMENT: HCPCS | Performed by: SPECIALIST

## 2024-04-22 PROCEDURE — 1123F ACP DISCUSS/DSCN MKR DOCD: CPT | Performed by: SPECIALIST

## 2024-04-22 ASSESSMENT — PATIENT HEALTH QUESTIONNAIRE - PHQ9
2. FEELING DOWN, DEPRESSED OR HOPELESS: NOT AT ALL
SUM OF ALL RESPONSES TO PHQ QUESTIONS 1-9: 0
1. LITTLE INTEREST OR PLEASURE IN DOING THINGS: NOT AT ALL
SUM OF ALL RESPONSES TO PHQ QUESTIONS 1-9: 0
SUM OF ALL RESPONSES TO PHQ9 QUESTIONS 1 & 2: 0

## 2024-04-22 NOTE — PROGRESS NOTES
Andrae Oquendo     1955       Luisito Myers MD, [facility]  Date of Visit-4/22/2024   PCP is Gerber Roque Jr., MD   [facility] [place]  [facility] [place]  HPI:  Andrae Oquendo is a 68 y.o. male   1 year follow-up  CAD/NSTEMI/BIB mid LAD 3 x 18 mm stent 12/9/2021    Today   Mr. Mcleod returns today accompanied by his wife.  He is doing very well.  He had no cardiac symptoms.  His stress echo today is normal with a good walk time at 9 minutes and 44 seconds.  The left ventricle is normal at rest and shows appropriate augmentation in all segments.  Stress test is normal.    He had some muscle aches are very minor that are resolved.  He did go from 20-10 on the Crestor but does not really feel like it changed much, I  think some of this just mild arthritis.    He still pretty physically active which is great  He is now far enough out that he can stop Plavix.    He did talk about 2 semi-related to cardiac issues.    He had gout previously he took about 3 tablets of colchicine and saw his PCP.  I told him he can continue those medications I do not see an problem especially now that were going off Plavix but I do not think he should take more than 1 or 2 tablets in the short period of time and will go under the direction of his PCP guidance.    The second question raised was co-Q10.  He is not having muscle aches as he did not really notice a change we went from 20 mg down to 10 mg anyway.  It only needs take co-Q10 unless he is having muscle aches.  There is no real data otherwise not.      Denies chest pain, edema, syncope or shortness of breath at rest   Has no tachycardia , palpitations or sense of arrythmia       Echo 3/28/2022 EF 55%  04/22/24    STRESS ECHO (PRN CONTRAST/BUBBLE/STRAIN/3D) 04/22/2024 12:19 PM (Final)    Interpretation Summary    Study Impression: The study is normal.    Post-stress Echo: The post-stress echo showed no new wall motion abnormalities.

## 2025-01-30 DIAGNOSIS — E78.00 PURE HYPERCHOLESTEROLEMIA, UNSPECIFIED: ICD-10-CM

## 2025-01-30 DIAGNOSIS — I25.10 ATHEROSCLEROTIC HEART DISEASE OF NATIVE CORONARY ARTERY WITHOUT ANGINA PECTORIS: ICD-10-CM

## 2025-03-29 DIAGNOSIS — E78.00 PURE HYPERCHOLESTEROLEMIA, UNSPECIFIED: ICD-10-CM

## 2025-03-29 DIAGNOSIS — I25.10 ATHEROSCLEROTIC HEART DISEASE OF NATIVE CORONARY ARTERY WITHOUT ANGINA PECTORIS: ICD-10-CM

## 2025-03-31 RX ORDER — ROSUVASTATIN CALCIUM 10 MG/1
10 TABLET, COATED ORAL NIGHTLY
Qty: 90 TABLET | Refills: 1 | Status: SHIPPED | OUTPATIENT
Start: 2025-03-31

## 2025-03-31 NOTE — TELEPHONE ENCOUNTER
Per VO of MD    LOV: 3/28/2023     Future Appointments   Date Time Provider Department Center   4/8/2025  8:20 AM Jordyn Holloway, MD [facility]       Requested Prescriptions     Signed Prescriptions Disp Refills    rosuvastatin (CRESTOR) 10 MG tablet 90 tablet 1     Sig: TAKE 1 TABLET BY MOUTH EVERY DAY AT NIGHT     Authorizing Provider: JORDYN HOLLOWAY     Ordering User: BLANK GAMBLE

## 2025-04-01 DIAGNOSIS — I25.10 ATHEROSCLEROTIC HEART DISEASE OF NATIVE CORONARY ARTERY WITHOUT ANGINA PECTORIS: ICD-10-CM

## 2025-04-01 DIAGNOSIS — E78.00 PURE HYPERCHOLESTEROLEMIA, UNSPECIFIED: ICD-10-CM

## 2025-04-01 LAB
ALBUMIN SERPL-MCNC: 4.3 G/DL (ref 3.5–5)
ALBUMIN/GLOB SERPL: 1.5 (ref 1.1–2.2)
ALP SERPL-CCNC: 71 U/L (ref 45–117)
ALT SERPL-CCNC: 22 U/L (ref 12–78)
ANION GAP SERPL CALC-SCNC: 4 MMOL/L (ref 2–12)
AST SERPL-CCNC: 26 U/L (ref 15–37)
BILIRUB SERPL-MCNC: 0.8 MG/DL (ref 0.2–1)
BUN SERPL-MCNC: 16 MG/DL (ref 6–20)
BUN/CREAT SERPL: 17 (ref 12–20)
CALCIUM SERPL-MCNC: 9.2 MG/DL (ref 8.5–10.1)
CHLORIDE SERPL-SCNC: 105 MMOL/L (ref 97–108)
CHOLEST SERPL-MCNC: 155 MG/DL
CO2 SERPL-SCNC: 29 MMOL/L (ref 21–32)
CREAT SERPL-MCNC: 0.94 MG/DL (ref 0.7–1.3)
ERYTHROCYTE [DISTWIDTH] IN BLOOD BY AUTOMATED COUNT: 12.4 % (ref 11.5–14.5)
GLOBULIN SER CALC-MCNC: 2.9 G/DL (ref 2–4)
GLUCOSE SERPL-MCNC: 98 MG/DL (ref 65–100)
HCT VFR BLD AUTO: 41.4 % (ref 36.6–50.3)
HDLC SERPL-MCNC: 70 MG/DL
HDLC SERPL: 2.2 (ref 0–5)
HGB BLD-MCNC: 13.8 G/DL (ref 12.1–17)
LDLC SERPL CALC-MCNC: 72 MG/DL (ref 0–100)
MCH RBC QN AUTO: 30.1 PG (ref 26–34)
MCHC RBC AUTO-ENTMCNC: 33.3 G/DL (ref 30–36.5)
MCV RBC AUTO: 90.4 FL (ref 80–99)
NRBC # BLD: 0 K/UL (ref 0–0.01)
NRBC BLD-RTO: 0 PER 100 WBC
PLATELET # BLD AUTO: 167 K/UL (ref 150–400)
PMV BLD AUTO: 10.8 FL (ref 8.9–12.9)
POTASSIUM SERPL-SCNC: 4.3 MMOL/L (ref 3.5–5.1)
PROT SERPL-MCNC: 7.2 G/DL (ref 6.4–8.2)
RBC # BLD AUTO: 4.58 M/UL (ref 4.1–5.7)
SODIUM SERPL-SCNC: 138 MMOL/L (ref 136–145)
TRIGL SERPL-MCNC: 65 MG/DL
TSH SERPL DL<=0.05 MIU/L-ACNC: 2.05 UIU/ML (ref 0.36–3.74)
VLDLC SERPL CALC-MCNC: 13 MG/DL
WBC # BLD AUTO: 5.2 K/UL (ref 4.1–11.1)

## 2025-04-08 ENCOUNTER — OFFICE VISIT (OUTPATIENT)
Age: 70
End: 2025-04-08
Payer: MEDICARE

## 2025-04-08 VITALS
SYSTOLIC BLOOD PRESSURE: 120 MMHG | HEART RATE: 58 BPM | DIASTOLIC BLOOD PRESSURE: 62 MMHG | OXYGEN SATURATION: 96 % | HEIGHT: 70 IN | RESPIRATION RATE: 16 BRPM | BODY MASS INDEX: 26.48 KG/M2 | WEIGHT: 185 LBS

## 2025-04-08 DIAGNOSIS — R03.0 ELEVATED BLOOD-PRESSURE READING, WITHOUT DIAGNOSIS OF HYPERTENSION: ICD-10-CM

## 2025-04-08 DIAGNOSIS — I21.4 NON-ST ELEVATION (NSTEMI) MYOCARDIAL INFARCTION (HCC): ICD-10-CM

## 2025-04-08 DIAGNOSIS — Z91.89 CARDIOVASCULAR RISK FACTOR: ICD-10-CM

## 2025-04-08 DIAGNOSIS — E78.00 PURE HYPERCHOLESTEROLEMIA, UNSPECIFIED: ICD-10-CM

## 2025-04-08 DIAGNOSIS — I25.10 CORONARY ARTERY DISEASE INVOLVING NATIVE CORONARY ARTERY OF NATIVE HEART WITHOUT ANGINA PECTORIS: Primary | ICD-10-CM

## 2025-04-08 PROCEDURE — 1036F TOBACCO NON-USER: CPT | Performed by: SPECIALIST

## 2025-04-08 PROCEDURE — G8427 DOCREV CUR MEDS BY ELIG CLIN: HCPCS | Performed by: SPECIALIST

## 2025-04-08 PROCEDURE — 99214 OFFICE O/P EST MOD 30 MIN: CPT | Performed by: SPECIALIST

## 2025-04-08 PROCEDURE — 1123F ACP DISCUSS/DSCN MKR DOCD: CPT | Performed by: SPECIALIST

## 2025-04-08 PROCEDURE — 1159F MED LIST DOCD IN RCRD: CPT | Performed by: SPECIALIST

## 2025-04-08 PROCEDURE — 3017F COLORECTAL CA SCREEN DOC REV: CPT | Performed by: SPECIALIST

## 2025-04-08 PROCEDURE — 1126F AMNT PAIN NOTED NONE PRSNT: CPT | Performed by: SPECIALIST

## 2025-04-08 PROCEDURE — G8419 CALC BMI OUT NRM PARAM NOF/U: HCPCS | Performed by: SPECIALIST

## 2025-04-08 PROCEDURE — 93000 ELECTROCARDIOGRAM COMPLETE: CPT | Performed by: SPECIALIST

## 2025-04-08 ASSESSMENT — PATIENT HEALTH QUESTIONNAIRE - PHQ9
SUM OF ALL RESPONSES TO PHQ QUESTIONS 1-9: 0
1. LITTLE INTEREST OR PLEASURE IN DOING THINGS: NOT AT ALL
SUM OF ALL RESPONSES TO PHQ QUESTIONS 1-9: 0
SUM OF ALL RESPONSES TO PHQ QUESTIONS 1-9: 0
2. FEELING DOWN, DEPRESSED OR HOPELESS: NOT AT ALL
SUM OF ALL RESPONSES TO PHQ QUESTIONS 1-9: 0

## 2025-04-08 NOTE — PATIENT INSTRUCTIONS
We will see you back for an annual office visit with a stress echo the same day. Have fasting labs completed about a week prior.    Stress echo: Arrive 15 minutes prior to your appointment. Wear comfortable clothes and shoes. Please do not eat or drink anything other than water two hours prior to test.     [facility]:    [facility] [place]   [address], [address]  [place], [place] [address]  Monday-Friday 7A-5P  [phone]    [place]  [address] [place], [place] [address]  Monday-Friday 7A-430P  [phone]

## 2025-04-08 NOTE — PROGRESS NOTES
1. Have you been to the ER, urgent care clinic since your last visit?  Hospitalized since your last visit?No    2. Have you seen or consulted any other health care providers outside of the [facility] since your last visit?  Include any pap smears or colon screening. No    
  Component Value Date    CHOLHDLRATIO 2.2 04/01/2025    CHOLHDLRATIO 3.1 12/10/2021      Lab Results   Component Value Date/Time     04/01/2025 09:33 AM    K 4.3 04/01/2025 09:33 AM     04/01/2025 09:33 AM    CO2 29 04/01/2025 09:33 AM    BUN 16 04/01/2025 09:33 AM    CREATININE 0.94 04/01/2025 09:33 AM    GLUCOSE 98 04/01/2025 09:33 AM    CALCIUM 9.2 04/01/2025 09:33 AM    LABGLOM 87 04/01/2025 09:33 AM       Wt Readings from Last 3 Encounters:   04/08/25 83.9 kg (185 lb)   04/22/24 87.1 kg (192 lb)   04/22/24 87.1 kg (192 lb)      BP Readings from Last 3 Encounters:   04/08/25 120/62   04/22/24 128/70   04/22/24 128/70        Current Outpatient Medications:     rosuvastatin (CRESTOR) 10 MG tablet, TAKE 1 TABLET BY MOUTH EVERY DAY AT NIGHT, Disp: 90 tablet, Rfl: 1    aspirin 81 MG chewable tablet, Take 1 tablet by mouth daily, Disp: , Rfl:    Impression see above.

## (undated) DEVICE — ANGIOGRAPHY KIT

## (undated) DEVICE — CATH GUID .056IN 6FR 150CM -- GUIDELINER V3

## (undated) DEVICE — SPECIAL PROCEDURE DRAPE 32" X 34": Brand: SPECIAL PROCEDURE DRAPE

## (undated) DEVICE — CATH GUID COR EB35 6FR 100CM -- LAUNCHER

## (undated) DEVICE — Device: Brand: EAGLE EYE PLATINUM RX DIGITAL IVUS CATHETER

## (undated) DEVICE — CATH BLLN DIL 2.5 X12MM RX -- EUPHORA

## (undated) DEVICE — KIT HND CTRL 3 W STPCOCK ROT END 54IN PREM HI PRSS TBNG AT

## (undated) DEVICE — CATH 5F 100CM JR40 -- DXTERITY

## (undated) DEVICE — GLIDESHEATH SLENDER STAINLESS STEEL KIT: Brand: GLIDESHEATH SLENDER

## (undated) DEVICE — WASTE KIT - ST MARY: Brand: MEDLINE INDUSTRIES, INC.

## (undated) DEVICE — KIT MED IMAG CNTRST AGNT W/ IOPAMIDOL REUSE

## (undated) DEVICE — SPLINT WR POS F/ARTERIAL ACC -- BX/10

## (undated) DEVICE — Device: Brand: ASAHI SION BLUE

## (undated) DEVICE — Z DUPLICATE USE 2103554 VALVE HEMOSTATIC BLEEDBK CTRL COPILOT

## (undated) DEVICE — STENT RONYX30022UX RESOLUTE ONYX 3.00X22
Type: IMPLANTABLE DEVICE | Status: NON-FUNCTIONAL
Brand: RESOLUTE ONYX™

## (undated) DEVICE — CATH 5F 100CM JL35 -- DXTERITY

## (undated) DEVICE — CATH BLLN ANGIO 3.50X6MM NC EUPHORIA RX

## (undated) DEVICE — KIT MFLD ISOLATN NACL CNTRST PRT TBNG SPIK W/ PRSS TRNSDUC

## (undated) DEVICE — STENT RONYX30026UX RESOLUTE ONYX 3.00X26
Type: IMPLANTABLE DEVICE | Status: NON-FUNCTIONAL
Brand: RESOLUTE ONYX™

## (undated) DEVICE — PACK PROCEDURE SURG HRT CATH

## (undated) DEVICE — CUSTOM KT PTCA INFL DEV K05 00052M

## (undated) DEVICE — DEVICE COMPR REG 24 CM VASC BND